# Patient Record
Sex: MALE | Race: WHITE | NOT HISPANIC OR LATINO | URBAN - METROPOLITAN AREA
[De-identification: names, ages, dates, MRNs, and addresses within clinical notes are randomized per-mention and may not be internally consistent; named-entity substitution may affect disease eponyms.]

---

## 2019-09-05 ENCOUNTER — EMERGENCY (EMERGENCY)
Facility: HOSPITAL | Age: 49
LOS: 1 days | Discharge: HOME | End: 2019-09-05
Attending: EMERGENCY MEDICINE
Payer: COMMERCIAL

## 2019-09-05 VITALS
DIASTOLIC BLOOD PRESSURE: 92 MMHG | SYSTOLIC BLOOD PRESSURE: 147 MMHG | OXYGEN SATURATION: 98 % | RESPIRATION RATE: 16 BRPM | HEART RATE: 64 BPM | TEMPERATURE: 98 F

## 2019-09-05 VITALS
SYSTOLIC BLOOD PRESSURE: 155 MMHG | RESPIRATION RATE: 18 BRPM | TEMPERATURE: 98 F | HEART RATE: 72 BPM | OXYGEN SATURATION: 99 % | WEIGHT: 220.02 LBS | DIASTOLIC BLOOD PRESSURE: 97 MMHG

## 2019-09-05 LAB
ALBUMIN SERPL ELPH-MCNC: 4.5 G/DL — SIGNIFICANT CHANGE UP (ref 3.3–5)
ALP SERPL-CCNC: 61 U/L — SIGNIFICANT CHANGE UP (ref 40–120)
ALT FLD-CCNC: 56 U/L — HIGH (ref 10–45)
ANION GAP SERPL CALC-SCNC: 13 MMOL/L — SIGNIFICANT CHANGE UP (ref 5–17)
AST SERPL-CCNC: 34 U/L — SIGNIFICANT CHANGE UP (ref 10–40)
BILIRUB SERPL-MCNC: 0.6 MG/DL — SIGNIFICANT CHANGE UP (ref 0.2–1.2)
BUN SERPL-MCNC: 20 MG/DL — SIGNIFICANT CHANGE UP (ref 7–23)
CALCIUM SERPL-MCNC: 10.2 MG/DL — SIGNIFICANT CHANGE UP (ref 8.4–10.5)
CHLORIDE SERPL-SCNC: 98 MMOL/L — SIGNIFICANT CHANGE UP (ref 96–108)
CO2 SERPL-SCNC: 24 MMOL/L — SIGNIFICANT CHANGE UP (ref 22–31)
CREAT SERPL-MCNC: 0.91 MG/DL — SIGNIFICANT CHANGE UP (ref 0.5–1.3)
GLUCOSE SERPL-MCNC: 180 MG/DL — HIGH (ref 70–99)
HCT VFR BLD CALC: 45.1 % — SIGNIFICANT CHANGE UP (ref 39–50)
HGB BLD-MCNC: 15.1 G/DL — SIGNIFICANT CHANGE UP (ref 13–17)
MCHC RBC-ENTMCNC: 29.2 PG — SIGNIFICANT CHANGE UP (ref 27–34)
MCHC RBC-ENTMCNC: 33.6 GM/DL — SIGNIFICANT CHANGE UP (ref 32–36)
MCV RBC AUTO: 86.9 FL — SIGNIFICANT CHANGE UP (ref 80–100)
PLATELET # BLD AUTO: 218 K/UL — SIGNIFICANT CHANGE UP (ref 150–400)
POTASSIUM SERPL-MCNC: 4.2 MMOL/L — SIGNIFICANT CHANGE UP (ref 3.5–5.3)
POTASSIUM SERPL-SCNC: 4.2 MMOL/L — SIGNIFICANT CHANGE UP (ref 3.5–5.3)
PROT SERPL-MCNC: 7.8 G/DL — SIGNIFICANT CHANGE UP (ref 6–8.3)
RBC # BLD: 5.19 M/UL — SIGNIFICANT CHANGE UP (ref 4.2–5.8)
RBC # FLD: 13.5 % — SIGNIFICANT CHANGE UP (ref 10.3–14.5)
SODIUM SERPL-SCNC: 135 MMOL/L — SIGNIFICANT CHANGE UP (ref 135–145)
WBC # BLD: 10.8 K/UL — HIGH (ref 3.8–10.5)
WBC # FLD AUTO: 10.8 K/UL — HIGH (ref 3.8–10.5)

## 2019-09-05 PROCEDURE — 99284 EMERGENCY DEPT VISIT MOD MDM: CPT

## 2019-09-05 PROCEDURE — 96374 THER/PROPH/DIAG INJ IV PUSH: CPT

## 2019-09-05 PROCEDURE — 93971 EXTREMITY STUDY: CPT | Mod: 26

## 2019-09-05 PROCEDURE — 93971 EXTREMITY STUDY: CPT

## 2019-09-05 PROCEDURE — 80053 COMPREHEN METABOLIC PANEL: CPT

## 2019-09-05 PROCEDURE — 99284 EMERGENCY DEPT VISIT MOD MDM: CPT | Mod: 25

## 2019-09-05 PROCEDURE — 85027 COMPLETE CBC AUTOMATED: CPT

## 2019-09-05 RX ORDER — CEFTRIAXONE 500 MG/1
1000 INJECTION, POWDER, FOR SOLUTION INTRAMUSCULAR; INTRAVENOUS ONCE
Refills: 0 | Status: COMPLETED | OUTPATIENT
Start: 2019-09-05 | End: 2019-09-05

## 2019-09-05 RX ORDER — CEPHALEXIN 500 MG
1 CAPSULE ORAL
Qty: 28 | Refills: 0
Start: 2019-09-05 | End: 2019-09-11

## 2019-09-05 RX ORDER — CEFTRIAXONE 500 MG/1
1000 INJECTION, POWDER, FOR SOLUTION INTRAMUSCULAR; INTRAVENOUS ONCE
Refills: 0 | Status: DISCONTINUED | OUTPATIENT
Start: 2019-09-05 | End: 2019-09-05

## 2019-09-05 RX ORDER — IBUPROFEN 200 MG
600 TABLET ORAL ONCE
Refills: 0 | Status: COMPLETED | OUTPATIENT
Start: 2019-09-05 | End: 2019-09-05

## 2019-09-05 RX ADMIN — Medication 600 MILLIGRAM(S): at 17:48

## 2019-09-05 RX ADMIN — CEFTRIAXONE 100 MILLIGRAM(S): 500 INJECTION, POWDER, FOR SOLUTION INTRAMUSCULAR; INTRAVENOUS at 18:29

## 2019-09-05 RX ADMIN — Medication 600 MILLIGRAM(S): at 15:02

## 2019-09-05 NOTE — ED PROVIDER NOTE - PROGRESS NOTE DETAILS
Resident Evy Hilliard: patient is re-evaluated. no new complaints. Labs/imaging results are discussed. All questions are answered. Discharge plan is dicussed with the patient: patient reports understanding to follow up with his/her PCP within *3-5days, and to return to ER if patient develops worsening rash, fever, pain/swelling in calf or any other alarming symptoms.

## 2019-09-05 NOTE — ED PROVIDER NOTE - NS ED ROS FT
Gen: Denies fever, chills  CV: Denies chest pain,  Skin: +rash on right leg  Resp: Denies SOB  GI: Denies nausea, vomiting, diarrhea  Msk:+rle swelling/pain Denies back pain,  : Denies dysuria, increased frequency/urgency  Neuro: Denies LOC, weakness

## 2019-09-05 NOTE — ED PROVIDER NOTE - OBJECTIVE STATEMENT
49 yo M with pmhx of HTN, DM, eczema presents with redness/swelling of rt lower extremity for past 2 days. Denies trauma to leg. Last long drive - 4hrs - 2 weeks ago. Denies hx of PE, DVT, chest pain, shortness of breath, abdominal pain, nausea, vomiting, diarrhea. Denies IV drug use, recent hospitalization. 47 yo M with pmhx of HTN, DM, eczema presents with redness/swelling of rt lower extremity for past 2 days. Denies trauma to leg. Last long drive - 4hrs - 2 weeks ago. Denies hx of PE, DVT, chest pain, shortness of breath, abdominal pain, nausea, vomiting, diarrhea. Denies IV drug use, recent hospitalization.  Sent by PCP r/o DVT

## 2019-09-05 NOTE — ED PROVIDER NOTE - NSFOLLOWUPINSTRUCTIONS_ED_ALL_ED_FT
1. TAKE ALL MEDICATIONS AS DIRECTED.    2. FOR PAIN OR FEVER YOU CAN TAKE IBUPROFEN (MOTRIN, ADVIL) OR ACETAMINOPHEN (TYLENOL) AS NEEDED, AS DIRECTED ON PACKAGING.  3. FOLLOW UP WITH YOUR PRIMARY DOCTOR WITHIN 3-5 DAYS AS DIRECTED.  4. IF YOU HAD LABS OR IMAGING DONE, YOU WERE GIVEN COPIES OF ALL LABS AND/OR IMAGING RESULTS FROM YOUR ER VISIT--PLEASE TAKE THEM WITH YOU TO YOUR FOLLOW UP APPOINTMENTS.  5. IF NEEDED, CALL PATIENT ACCESS SERVICES AT [phone] TO FIND A PRIMARY CARE PHYSICIAN.  OR CALL [phone] TO MAKE AN APPOINTMENT WITH THE CLINIC.  6. RETURN TO THE ER FOR ANY WORSENING SYMPTOMS OR CONCERNS.

## 2019-09-05 NOTE — ED ADULT NURSE NOTE - NSIMPLEMENTINTERV_GEN_ALL_ED
Implemented All Universal Safety Interventions:  [place] to call system. Call bell, personal items and telephone within reach. Instruct patient to call for assistance. Room bathroom lighting operational. Non-slip footwear when patient is off stretcher. Physically safe environment: no spills, clutter or unnecessary equipment. Stretcher in lowest position, wheels locked, appropriate side rails in place.

## 2019-09-05 NOTE — ED PROVIDER NOTE - ATTENDING CONTRIBUTION TO CARE
I performed a history and physical exam of the patient and discussed their management with the resident and /or advanced care provider. I reviewed the resident and /or ACP's note and agree with the documented findings and plan of care. My medical decison making and observations are found above.  Lungs clear lt leg with area of warmth and swelling and pain behind lt knee and up into thigh

## 2019-09-05 NOTE — ED PROVIDER NOTE - PHYSICAL EXAMINATION
Gen: well developed and well nourished, NAD  Eyes: PERRL, EOMI, anicteric  ENT: airway patent, mmm, oral cavity and pharynx normal. No inflammation, swelling, exudate, or lesions.   NECK: Neck supple, non-tender without lymphadenopathy, no masses.  CV: RRR, +S1/S2, no M/R/G  Resp: CTAB, symmetric breath sounds, no W/R/R  GI: +BS, abdomen soft non-distended, NTTP, no masses, no rebound, no guarding  Back: no CVA tenderness  Extremities - FROM, symmetric pulses, capillary refill < 2 seconds, no edema, 5/5 strength, sensation intact  Skin: no rashes, colors changes or bruising  Neuro: A&Ox3, following commands, speech clear, moving all four extremities spontaneously  Psych: appropriate mood, normal insight Gen: well developed and well nourished, NAD  Eyes: PERRL  ENT: airway patent  CV: RRR, +S1/S2, no M/R/G  Resp: CTAB, symmetric breath sounds, no W/R/R  GI: +BS, abdomen soft non-distended, NTTP  Extremities - +4*4cm erythematous patch on medial aspect of rt distal thigh, +ttp; no calf tenderness/swelling; FROM, symmetric pulses, capillary refill < 2 seconds, no edema, 5/5 strength, sensation intact; +RT sole: peeling/dry/mild erythemaous, nontender - c/w eczema  Neuro: A&Ox3, following commands, speech clear, moving all four extremities spontaneously  Psych: appropriate mood, normal insight Gen: well developed and well nourished, NAD  Eyes: PERRL  ENT: airway patent  CV: RRR, +S1/S2, no M/R/G  Resp: CTAB, symmetric breath sounds, no W/R/R  GI: +BS, abdomen soft non-distended, NTTP  Extremities - +4*4cm warm, erythematous patch on medial aspect of rt distal thigh, +ttp; no calf tenderness/swelling; FROM, symmetric pulses, capillary refill < 2 seconds, no edema, 5/5 strength, sensation intact; +RT sole: peeling/dry/mild erythemaous, nontender - c/w eczema  Neuro: A&Ox3, following commands, speech clear, moving all four extremities spontaneously  Psych: appropriate mood, normal insight

## 2019-09-05 NOTE — ED PROVIDER NOTE - CLINICAL SUMMARY MEDICAL DECISION MAKING FREE TEXT BOX
Nagi: lt lower ext swelling redness and warmth. Sent by PMD for R/O dvt but he has no risk factors and no symptoms of dvt.  will get labs for diabetes and start antibiotics.

## 2019-09-05 NOTE — ED ADULT NURSE NOTE - OBJECTIVE STATEMENT
47 y/o M presents ambulatory to ED for a few days of R posterior knee redness/swelling with mild "soreness." Pt reports he has eczema on his feet with open cuts at this time. No fevers, chills, n/v. Pt last took Tylenol/Motrin last night. No SOB, palpitations, chest pain, hemoptysis, cough. Wife at bedside, safety maintained.

## 2019-09-05 NOTE — ED PROVIDER NOTE - PATIENT PORTAL LINK FT
You can access the FollowMyHealth Patient Portal offered by [facility] by registering at the following website: [email or web address]. By joining [employer]’s FollowMyHealth portal, you will also be able to view your health information using other applications (apps) compatible with our system.

## 2020-03-06 PROBLEM — Z00.00 ENCOUNTER FOR PREVENTIVE HEALTH EXAMINATION: Status: ACTIVE | Noted: 2020-03-06

## 2020-03-25 ENCOUNTER — APPOINTMENT (OUTPATIENT)
Dept: SPINE | Facility: CLINIC | Age: 50
End: 2020-03-25

## 2020-08-19 ENCOUNTER — APPOINTMENT (OUTPATIENT)
Dept: SPINE | Facility: CLINIC | Age: 50
End: 2020-08-19
Payer: COMMERCIAL

## 2020-08-19 DIAGNOSIS — Z80.42 FAMILY HISTORY OF MALIGNANT NEOPLASM OF PROSTATE: ICD-10-CM

## 2020-08-19 DIAGNOSIS — Z86.79 PERSONAL HISTORY OF OTHER DISEASES OF THE CIRCULATORY SYSTEM: ICD-10-CM

## 2020-08-19 DIAGNOSIS — Z86.39 PERSONAL HISTORY OF OTHER ENDOCRINE, NUTRITIONAL AND METABOLIC DISEASE: ICD-10-CM

## 2020-08-19 PROCEDURE — 99213 OFFICE O/P EST LOW 20 MIN: CPT | Mod: 95

## 2020-08-19 RX ORDER — EMPAGLIFLOZIN 25 MG/1
25 TABLET, FILM COATED ORAL
Refills: 0 | Status: ACTIVE | COMMUNITY

## 2020-08-19 RX ORDER — ATORVASTATIN CALCIUM 10 MG/1
10 TABLET, FILM COATED ORAL
Refills: 0 | Status: ACTIVE | COMMUNITY

## 2020-08-19 RX ORDER — OLMESARTAN MEDOXOMIL 20 MG/1
20 TABLET, FILM COATED ORAL
Refills: 0 | Status: ACTIVE | COMMUNITY

## 2020-08-19 RX ORDER — METFORMIN HYDROCHLORIDE 500 MG/1
500 TABLET, COATED ORAL
Refills: 0 | Status: ACTIVE | COMMUNITY

## 2020-08-19 RX ORDER — METOPROLOL SUCCINATE 50 MG/1
50 TABLET, EXTENDED RELEASE ORAL DAILY
Refills: 0 | Status: ACTIVE | COMMUNITY

## 2020-08-19 NOTE — PHYSICAL EXAM
[FreeTextEntry1] : The exam is limited due to this being a telehealth appointment.  The patient is alert and oriented to person, place and time.  Higher cortical functions are intact.  Face is symmetrical.  Speech is fluent.\par

## 2020-08-19 NOTE — REASON FOR VISIT
[FreeTextEntry1] : The patient is here for review of an MRI of the brain and sella with and without contrast.

## 2020-08-19 NOTE — HISTORY OF PRESENT ILLNESS
[Home] : at home, [unfilled] , at the time of the visit. [Medical Office: ([facility])___] : at the medical office located in  [Verbal consent obtained from patient] : the patient, [unfilled] [FreeTextEntry4] : MIREYA Salcedo. [FreeTextEntry1] : JOVANNA LEMA is a 49 year old gentleman who upon work up for erectile dysfunction was found to have a pituitary adenoma.  He stated that he is feeling well and denies any changes in his vision.  He has not seen Dr. Doe for visual field testing.\par

## 2020-09-09 ENCOUNTER — APPOINTMENT (OUTPATIENT)
Dept: SPINE | Facility: CLINIC | Age: 50
End: 2020-09-09
Payer: COMMERCIAL

## 2020-09-09 VITALS
HEART RATE: 83 BPM | OXYGEN SATURATION: 97 % | HEIGHT: 70 IN | TEMPERATURE: 96.4 F | WEIGHT: 215 LBS | BODY MASS INDEX: 30.78 KG/M2 | DIASTOLIC BLOOD PRESSURE: 100 MMHG | SYSTOLIC BLOOD PRESSURE: 143 MMHG

## 2020-09-09 PROCEDURE — 99213 OFFICE O/P EST LOW 20 MIN: CPT

## 2020-09-09 NOTE — REASON FOR VISIT
[Follow-Up: _____] : a [unfilled] follow-up visit [FreeTextEntry1] : The patient is here for review of a new MRI of the brain and sella with and without contrast. [Spouse] : spouse

## 2020-09-09 NOTE — DATA REVIEWED
[de-identified] : Brain and sella with and without contrast done at [facility] on 8/18/2020 and 2/29/2020.

## 2020-09-09 NOTE — PHYSICAL EXAM
[Time] : oriented to time [Person] : oriented to person [Place] : oriented to place [Remote Intact] : remote memory intact [Short Term Intact] : short term memory intact [Fluency] : fluency intact [Concentration Intact] : normal concentrating ability [Span Intact] : the attention span was normal [Current Events] : adequate knowledge of current events [Comprehension] : comprehension intact [Past History] : adequate knowledge of personal past history [Vocabulary] : adequate range of vocabulary [Cranial Nerves Oculomotor (III)] : extraocular motion intact [Cranial Nerves Optic (II)] : visual acuity intact bilaterally,  pupils equal round and reactive to light [Cranial Nerves Facial (VII)] : face symmetrical [Cranial Nerves Trigeminal (V)] : facial sensation intact symmetrically [Cranial Nerves Vestibulocochlear (VIII)] : hearing was intact bilaterally [Cranial Nerves Glossopharyngeal (IX)] : tongue and palate midline [Cranial Nerves Accessory (XI - Cranial And Spinal)] : head turning and shoulder shrug symmetric [No Muscle Atrophy] : normal bulk in all four extremities [Cranial Nerves Hypoglossal (XII)] : there was no tongue deviation with protrusion [Motor Tone] : muscle tone was normal in all four extremities [Motor Strength] : muscle strength was normal in all four extremities [Sensation Tactile Decrease] : light touch was intact [Abnormal Walk] : normal gait [Balance] : balance was intact [Past-pointing] : there was no past-pointing [Tremor] : no tremor present [1+] : Patella left 1+

## 2020-09-09 NOTE — ASSESSMENT
[FreeTextEntry1] : The images and findings were reviewed and discussed with the patient and his wife. It was discussed that his eyesight is stable and taking the tumor out would not change his testosterone levels.  The tumor is putting pressure on the optic chiasm.  See dictation by Dr. Gabe M.D.  Surgery to remove the tumor and the risks and benefits were discussed.

## 2020-09-09 NOTE — RESULTS/DATA
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  No significant change.

## 2020-10-07 ENCOUNTER — APPOINTMENT (OUTPATIENT)
Dept: OTOLARYNGOLOGY | Facility: CLINIC | Age: 50
End: 2020-10-07
Payer: COMMERCIAL

## 2020-10-07 VITALS
TEMPERATURE: 98.1 F | HEIGHT: 70 IN | SYSTOLIC BLOOD PRESSURE: 142 MMHG | WEIGHT: 216 LBS | DIASTOLIC BLOOD PRESSURE: 95 MMHG | BODY MASS INDEX: 30.92 KG/M2 | HEART RATE: 84 BPM

## 2020-10-07 PROCEDURE — 31231 NASAL ENDOSCOPY DX: CPT

## 2020-10-07 PROCEDURE — 99204 OFFICE O/P NEW MOD 45 MIN: CPT | Mod: 25

## 2020-10-07 NOTE — REASON FOR VISIT
[Initial Consultation] : an initial consultation for [FreeTextEntry2] : Going for TSPR with Dr. Bernal on 10/29/20

## 2020-10-07 NOTE — HISTORY OF PRESENT ILLNESS
[de-identified] : Going for TSPR with Dr. Bernal on 10/29/20\par No nasal congestion or sinus pain or pressure.  No clear d/c from nose.  Good sense of smell and taste.  Vision is good.  No hx of nasal surgery or trauma.

## 2020-10-07 NOTE — CONSULT LETTER
[Dear  ___] : Dear  [unfilled], [Consult Letter:] : I had the pleasure of evaluating your patient, [unfilled]. [Please see my note below.] : Please see my note below. [Consult Closing:] : Thank you very much for allowing me to participate in the care of this patient.  If you have any questions, please do not hesitate to contact me. [Sincerely,] : Sincerely, [FreeTextEntry3] : Cristina Ashby MD\par Otolaryngology and Cranial Base Surgery\par Attending Physician - Department of Otolaryngology and Head & Neck Surgery\par [facility]\par  - [employer] [facility]\par Office: [phone]\par Fax: [phone]\par

## 2020-10-07 NOTE — PROCEDURE
[FreeTextEntry6] : Afrin and lidocaine were topically sprayed. Flexible scope #2 was used. Right nasal passage with normal inferior, middle and superior turbinates. Nasal passage patent with clear middle meatus and sphenoethmoid recess. Left nasal passage with normal inferior, middle and superior turbinates. Nasal passage was patent but narrowed by left DNS with clear middle meatus and sphenoethmoid recess. No mucopurulence or polyps appreciated. Nasopharynx clear.

## 2020-10-07 NOTE — ASSESSMENT
[FreeTextEntry1] : Going for TSPR with Dr. Bernal on 10/29/20:\par - discussed my role in the surgery including the nasal and sinus procedures that I will be performing in order to provide the neurosurgeon access to the pituitary tumor\par - expected post-op hospital course discussed including need for possible repair of spinal fluid leak and close observation for 2-3 days\par - post-op care consisting of nasal saline irrigations was reviewed, and [facility] sinus pamphlet was given to patient to obtain prior to surgery so they have ready to use on discharge home\par - discussed post-operative issues including nasal congestion, loss of sense of smell which should be temporary but can be permanent, bloody nasal drainage, facial pressure/headaches, bleeding, and infection\par - all questions answered and patient will call if any further concerns\par - plan to see patient on AM of surgery\par

## 2020-10-22 ENCOUNTER — OUTPATIENT (OUTPATIENT)
Dept: OUTPATIENT SERVICES | Facility: HOSPITAL | Age: 50
LOS: 1 days | End: 2020-10-22
Payer: COMMERCIAL

## 2020-10-22 VITALS
HEIGHT: 70 IN | TEMPERATURE: 98 F | RESPIRATION RATE: 16 BRPM | DIASTOLIC BLOOD PRESSURE: 89 MMHG | HEART RATE: 80 BPM | WEIGHT: 216.05 LBS | SYSTOLIC BLOOD PRESSURE: 145 MMHG | OXYGEN SATURATION: 98 %

## 2020-10-22 DIAGNOSIS — Z01.818 ENCOUNTER FOR OTHER PREPROCEDURAL EXAMINATION: ICD-10-CM

## 2020-10-22 DIAGNOSIS — D35.2 BENIGN NEOPLASM OF PITUITARY GLAND: ICD-10-CM

## 2020-10-22 DIAGNOSIS — Z29.9 ENCOUNTER FOR PROPHYLACTIC MEASURES, UNSPECIFIED: ICD-10-CM

## 2020-10-22 DIAGNOSIS — E11.9 TYPE 2 DIABETES MELLITUS WITHOUT COMPLICATIONS: ICD-10-CM

## 2020-10-22 DIAGNOSIS — I10 ESSENTIAL (PRIMARY) HYPERTENSION: ICD-10-CM

## 2020-10-22 LAB
A1C WITH ESTIMATED AVERAGE GLUCOSE RESULT: 7 % — HIGH (ref 4–5.6)
ANION GAP SERPL CALC-SCNC: 10 MMOL/L — SIGNIFICANT CHANGE UP (ref 5–17)
BLD GP AB SCN SERPL QL: NEGATIVE — SIGNIFICANT CHANGE UP
BUN SERPL-MCNC: 18 MG/DL — SIGNIFICANT CHANGE UP (ref 7–23)
CALCIUM SERPL-MCNC: 10 MG/DL — SIGNIFICANT CHANGE UP (ref 8.4–10.5)
CHLORIDE SERPL-SCNC: 101 MMOL/L — SIGNIFICANT CHANGE UP (ref 96–108)
CO2 SERPL-SCNC: 26 MMOL/L — SIGNIFICANT CHANGE UP (ref 22–31)
CREAT SERPL-MCNC: 0.91 MG/DL — SIGNIFICANT CHANGE UP (ref 0.5–1.3)
ESTIMATED AVERAGE GLUCOSE: 154 MG/DL — HIGH (ref 68–114)
GLUCOSE SERPL-MCNC: 166 MG/DL — HIGH (ref 70–99)
HCT VFR BLD CALC: 50.4 % — HIGH (ref 39–50)
HGB BLD-MCNC: 16.8 G/DL — SIGNIFICANT CHANGE UP (ref 13–17)
HIV 1+2 AB+HIV1 P24 AG SERPL QL IA: SIGNIFICANT CHANGE UP
MCHC RBC-ENTMCNC: 29.9 PG — SIGNIFICANT CHANGE UP (ref 27–34)
MCHC RBC-ENTMCNC: 33.3 GM/DL — SIGNIFICANT CHANGE UP (ref 32–36)
MCV RBC AUTO: 89.8 FL — SIGNIFICANT CHANGE UP (ref 80–100)
NRBC # BLD: 0 /100 WBCS — SIGNIFICANT CHANGE UP (ref 0–0)
PLATELET # BLD AUTO: 225 K/UL — SIGNIFICANT CHANGE UP (ref 150–400)
POTASSIUM SERPL-MCNC: 4.4 MMOL/L — SIGNIFICANT CHANGE UP (ref 3.5–5.3)
POTASSIUM SERPL-SCNC: 4.4 MMOL/L — SIGNIFICANT CHANGE UP (ref 3.5–5.3)
RBC # BLD: 5.61 M/UL — SIGNIFICANT CHANGE UP (ref 4.2–5.8)
RBC # FLD: 14.5 % — SIGNIFICANT CHANGE UP (ref 10.3–14.5)
RH IG SCN BLD-IMP: POSITIVE — SIGNIFICANT CHANGE UP
SODIUM SERPL-SCNC: 137 MMOL/L — SIGNIFICANT CHANGE UP (ref 135–145)
WBC # BLD: 10.22 K/UL — SIGNIFICANT CHANGE UP (ref 3.8–10.5)
WBC # FLD AUTO: 10.22 K/UL — SIGNIFICANT CHANGE UP (ref 3.8–10.5)

## 2020-10-22 PROCEDURE — 86900 BLOOD TYPING SEROLOGIC ABO: CPT

## 2020-10-22 PROCEDURE — 83036 HEMOGLOBIN GLYCOSYLATED A1C: CPT

## 2020-10-22 PROCEDURE — 80048 BASIC METABOLIC PNL TOTAL CA: CPT

## 2020-10-22 PROCEDURE — 86901 BLOOD TYPING SEROLOGIC RH(D): CPT

## 2020-10-22 PROCEDURE — 87389 HIV-1 AG W/HIV-1&-2 AB AG IA: CPT

## 2020-10-22 PROCEDURE — G0463: CPT

## 2020-10-22 PROCEDURE — 86850 RBC ANTIBODY SCREEN: CPT

## 2020-10-22 PROCEDURE — 85027 COMPLETE CBC AUTOMATED: CPT

## 2020-10-22 RX ORDER — CEFAZOLIN SODIUM 1 G
2000 VIAL (EA) INJECTION ONCE
Refills: 0 | Status: DISCONTINUED | OUTPATIENT
Start: 2020-10-29 | End: 2020-10-30

## 2020-10-22 NOTE — H&P PST ADULT - NSICDXPASTMEDICALHX_GEN_ALL_CORE_FT
PAST MEDICAL HISTORY:  History of high cholesterol     HTN (hypertension)     Pituitary macroadenoma     T2DM (type 2 diabetes mellitus)

## 2020-10-22 NOTE — H&P PST ADULT - NEUROLOGICAL DETAILS
alert and oriented x 3/responds to verbal commands/normal strength/sensation intact/no spontaneous movement

## 2020-10-22 NOTE — H&P PST ADULT - ASSESSMENT
MANJEETI VTE 2.0 SCORE [CLOT updated 2019]    AGE RELATED RISK FACTORS                                                       MOBILITY RELATED FACTORS  [ x] Age 41-60 years                                            (1 Point)                    [ ] Bed rest                                                        (1 Point)  [ ] Age: 61-74 years                                           (2 Points)                  [ ] Plaster cast                                                   (2 Points)  [ ] Age= 75 years                                              (3 Points)                    [ ] Bed bound for more than 72 hours                 (2 Points)    DISEASE RELATED RISK FACTORS                                               GENDER SPECIFIC FACTORS  [ ] Edema in the lower extremities                       (1 Point)              [ ] Pregnancy                                                     (1 Point)  [ ] Varicose veins                                               (1 Point)                     [ ] Post-partum < 6 weeks                                   (1 Point)             [x ] BMI > 25 Kg/m2                                            (1 Point)                     [ ] Hormonal therapy  or oral contraception          (1 Point)                 [ ] Sepsis (in the previous month)                        (1 Point)               [ ] History of pregnancy complications                 (1 point)  [ ] Pneumonia or serious lung disease                                               [ ] Unexplained or recurrent                     (1 Point)           (in the previous month)                               (1 Point)  [ ] Abnormal pulmonary function test                     (1 Point)                 SURGERY RELATED RISK FACTORS  [ ] Acute myocardial infarction                              (1 Point)               [ ]  Section                                             (1 Point)  [ ] Congestive heart failure (in the previous month)  (1 Point)      [ ] Minor surgery                                                  (1 Point)   [ ] Inflammatory bowel disease                             (1 Point)               [ ] Arthroscopic surgery                                        (2 Points)  [ ] Central venous access                                      (2 Points)                [ x] General surgery lasting more than 45 minutes (2 points)  [ ] Malignancy- Present or previous                   (2 Points)                [ ] Elective arthroplasty                                         (5 points)    [ ] Stroke (in the previous month)                          (5 Points)                                                                                                                                                           HEMATOLOGY RELATED FACTORS                                                 TRAUMA RELATED RISK FACTORS  [ ] Prior episodes of VTE                                     (3 Points)                [ ] Fracture of the hip, pelvis, or leg                       (5 Points)  [ ] Positive family history for VTE                         (3 Points)             [ ] Acute spinal cord injury (in the previous month)  (5 Points)  [ ] Prothrombin 12510 A                                     (3 Points)               [ ] Paralysis  (less than 1 month)                             (5 Points)  [ ] Factor V Leiden                                             (3 Points)                  [ ] Multiple Trauma within 1 month                        (5 Points)  [ ] Lupus anticoagulants                                     (3 Points)                                                           [ ] Anticardiolipin antibodies                               (3 Points)                                                       [ ] High homocysteine in the blood                      (3 Points)                                             [ ] Other congenital or acquired thrombophilia      (3 Points)                                                [ ] Heparin induced thrombocytopenia                  (3 Points)                                     Total Score [    4      ]

## 2020-10-22 NOTE — H&P PST ADULT - NSICDXPROBLEM_GEN_ALL_CORE_FT
PROBLEM DIAGNOSES  Problem: Pituitary macroadenoma  Assessment and Plan:  Endoscopic Transphenoidal Removal Pituitary Tumor on 10/29/20.   Pre-op education provided - all questions answered. Pt verbalized understanding     Problem: T2DM (type 2 diabetes mellitus)  Assessment and Plan: Finger stick on day of surgery   Hold Metformin 24 hrs prior to surgery   Hold Jardiance 3 day prior to surgery     Problem: HTN (hypertension)  Assessment and Plan: continue on antihypertensive medications     Problem: Need for prophylactic measure  Assessment and Plan: The Caprini score indicates this patient is at risk for a VTE event (score 3-5).  Most surgical patients in this group would benefit from pharmacologic prophylaxis.  The surgical team will determine the balance between VTE risk and bleeding risk

## 2020-10-23 ENCOUNTER — APPOINTMENT (OUTPATIENT)
Dept: CT IMAGING | Facility: IMAGING CENTER | Age: 50
End: 2020-10-23
Payer: COMMERCIAL

## 2020-10-23 ENCOUNTER — OUTPATIENT (OUTPATIENT)
Dept: OUTPATIENT SERVICES | Facility: HOSPITAL | Age: 50
LOS: 1 days | End: 2020-10-23
Payer: COMMERCIAL

## 2020-10-23 ENCOUNTER — APPOINTMENT (OUTPATIENT)
Dept: MRI IMAGING | Facility: IMAGING CENTER | Age: 50
End: 2020-10-23
Payer: COMMERCIAL

## 2020-10-23 DIAGNOSIS — D35.2 BENIGN NEOPLASM OF PITUITARY GLAND: ICD-10-CM

## 2020-10-23 PROBLEM — Z86.39 PERSONAL HISTORY OF OTHER ENDOCRINE, NUTRITIONAL AND METABOLIC DISEASE: Chronic | Status: ACTIVE | Noted: 2020-10-22

## 2020-10-23 PROBLEM — E11.9 TYPE 2 DIABETES MELLITUS WITHOUT COMPLICATIONS: Chronic | Status: ACTIVE | Noted: 2020-10-22

## 2020-10-23 PROBLEM — I10 ESSENTIAL (PRIMARY) HYPERTENSION: Chronic | Status: ACTIVE | Noted: 2020-10-22

## 2020-10-23 PROCEDURE — 70553 MRI BRAIN STEM W/O & W/DYE: CPT | Mod: 26

## 2020-10-23 PROCEDURE — 70553 MRI BRAIN STEM W/O & W/DYE: CPT

## 2020-10-23 PROCEDURE — 70450 CT HEAD/BRAIN W/O DYE: CPT

## 2020-10-23 PROCEDURE — 70450 CT HEAD/BRAIN W/O DYE: CPT | Mod: 26

## 2020-10-23 PROCEDURE — A9585: CPT

## 2020-10-25 DIAGNOSIS — Z01.818 ENCOUNTER FOR OTHER PREPROCEDURAL EXAMINATION: ICD-10-CM

## 2020-10-26 ENCOUNTER — APPOINTMENT (OUTPATIENT)
Dept: DISASTER EMERGENCY | Facility: CLINIC | Age: 50
End: 2020-10-26

## 2020-10-27 LAB — SARS-COV-2 N GENE NPH QL NAA+PROBE: NOT DETECTED

## 2020-10-28 ENCOUNTER — TRANSCRIPTION ENCOUNTER (OUTPATIENT)
Age: 50
End: 2020-10-28

## 2020-10-29 ENCOUNTER — RESULT REVIEW (OUTPATIENT)
Age: 50
End: 2020-10-29

## 2020-10-29 ENCOUNTER — INPATIENT (INPATIENT)
Facility: HOSPITAL | Age: 50
LOS: 6 days | Discharge: HOME | DRG: 614 | End: 2020-11-05
Attending: NEUROLOGICAL SURGERY | Admitting: NEUROLOGICAL SURGERY
Payer: COMMERCIAL

## 2020-10-29 ENCOUNTER — APPOINTMENT (OUTPATIENT)
Dept: SPINE | Facility: HOSPITAL | Age: 50
End: 2020-10-29

## 2020-10-29 ENCOUNTER — APPOINTMENT (OUTPATIENT)
Dept: OTOLARYNGOLOGY | Facility: HOSPITAL | Age: 50
End: 2020-10-29

## 2020-10-29 ENCOUNTER — TRANSCRIPTION ENCOUNTER (OUTPATIENT)
Age: 50
End: 2020-10-29

## 2020-10-29 VITALS
SYSTOLIC BLOOD PRESSURE: 131 MMHG | DIASTOLIC BLOOD PRESSURE: 87 MMHG | WEIGHT: 216.05 LBS | RESPIRATION RATE: 16 BRPM | TEMPERATURE: 98 F | HEART RATE: 80 BPM | OXYGEN SATURATION: 98 % | HEIGHT: 70 IN

## 2020-10-29 DIAGNOSIS — D35.2 BENIGN NEOPLASM OF PITUITARY GLAND: ICD-10-CM

## 2020-10-29 LAB
ANION GAP SERPL CALC-SCNC: 13 MMOL/L — SIGNIFICANT CHANGE UP (ref 5–17)
ANION GAP SERPL CALC-SCNC: 16 MMOL/L — SIGNIFICANT CHANGE UP (ref 5–17)
BUN SERPL-MCNC: 15 MG/DL — SIGNIFICANT CHANGE UP (ref 7–23)
BUN SERPL-MCNC: 17 MG/DL — SIGNIFICANT CHANGE UP (ref 7–23)
CALCIUM SERPL-MCNC: 8.4 MG/DL — SIGNIFICANT CHANGE UP (ref 8.4–10.5)
CALCIUM SERPL-MCNC: 8.7 MG/DL — SIGNIFICANT CHANGE UP (ref 8.4–10.5)
CHLORIDE SERPL-SCNC: 101 MMOL/L — SIGNIFICANT CHANGE UP (ref 96–108)
CHLORIDE SERPL-SCNC: 102 MMOL/L — SIGNIFICANT CHANGE UP (ref 96–108)
CO2 SERPL-SCNC: 19 MMOL/L — LOW (ref 22–31)
CO2 SERPL-SCNC: 19 MMOL/L — LOW (ref 22–31)
CREAT SERPL-MCNC: 0.87 MG/DL — SIGNIFICANT CHANGE UP (ref 0.5–1.3)
CREAT SERPL-MCNC: 0.88 MG/DL — SIGNIFICANT CHANGE UP (ref 0.5–1.3)
GLUCOSE BLDC GLUCOMTR-MCNC: 138 MG/DL — HIGH (ref 70–99)
GLUCOSE BLDC GLUCOMTR-MCNC: 141 MG/DL — HIGH (ref 70–99)
GLUCOSE BLDC GLUCOMTR-MCNC: 156 MG/DL — HIGH (ref 70–99)
GLUCOSE SERPL-MCNC: 157 MG/DL — HIGH (ref 70–99)
GLUCOSE SERPL-MCNC: 165 MG/DL — HIGH (ref 70–99)
HCT VFR BLD CALC: 46 % — SIGNIFICANT CHANGE UP (ref 39–50)
HGB BLD-MCNC: 15.4 G/DL — SIGNIFICANT CHANGE UP (ref 13–17)
MAGNESIUM SERPL-MCNC: 2.1 MG/DL — SIGNIFICANT CHANGE UP (ref 1.6–2.6)
MAGNESIUM SERPL-MCNC: 2.3 MG/DL — SIGNIFICANT CHANGE UP (ref 1.6–2.6)
MCHC RBC-ENTMCNC: 29.7 PG — SIGNIFICANT CHANGE UP (ref 27–34)
MCHC RBC-ENTMCNC: 33.5 GM/DL — SIGNIFICANT CHANGE UP (ref 32–36)
MCV RBC AUTO: 88.8 FL — SIGNIFICANT CHANGE UP (ref 80–100)
NRBC # BLD: 0 /100 WBCS — SIGNIFICANT CHANGE UP (ref 0–0)
OSMOLALITY UR: 724 MOS/KG — SIGNIFICANT CHANGE UP (ref 300–900)
PHOSPHATE SERPL-MCNC: 2.4 MG/DL — LOW (ref 2.5–4.5)
PHOSPHATE SERPL-MCNC: 4.1 MG/DL — SIGNIFICANT CHANGE UP (ref 2.5–4.5)
PLATELET # BLD AUTO: 234 K/UL — SIGNIFICANT CHANGE UP (ref 150–400)
POTASSIUM SERPL-MCNC: 4.1 MMOL/L — SIGNIFICANT CHANGE UP (ref 3.5–5.3)
POTASSIUM SERPL-MCNC: 4.1 MMOL/L — SIGNIFICANT CHANGE UP (ref 3.5–5.3)
POTASSIUM SERPL-SCNC: 4.1 MMOL/L — SIGNIFICANT CHANGE UP (ref 3.5–5.3)
POTASSIUM SERPL-SCNC: 4.1 MMOL/L — SIGNIFICANT CHANGE UP (ref 3.5–5.3)
RBC # BLD: 5.18 M/UL — SIGNIFICANT CHANGE UP (ref 4.2–5.8)
RBC # FLD: 14.6 % — HIGH (ref 10.3–14.5)
RH IG SCN BLD-IMP: POSITIVE — SIGNIFICANT CHANGE UP
SODIUM SERPL-SCNC: 133 MMOL/L — LOW (ref 135–145)
SODIUM SERPL-SCNC: 137 MMOL/L — SIGNIFICANT CHANGE UP (ref 135–145)
SODIUM UR-SCNC: 97 MMOL/L — SIGNIFICANT CHANGE UP
WBC # BLD: 14.08 K/UL — HIGH (ref 3.8–10.5)
WBC # FLD AUTO: 14.08 K/UL — HIGH (ref 3.8–10.5)

## 2020-10-29 PROCEDURE — 62165 REMOVE PITUIT TUMOR W/SCOPE: CPT | Mod: 62

## 2020-10-29 PROCEDURE — 88360 TUMOR IMMUNOHISTOCHEM/MANUAL: CPT | Mod: 26

## 2020-10-29 PROCEDURE — 99024 POSTOP FOLLOW-UP VISIT: CPT

## 2020-10-29 PROCEDURE — 61782 SCAN PROC CRANIAL EXTRA: CPT

## 2020-10-29 PROCEDURE — 99291 CRITICAL CARE FIRST HOUR: CPT

## 2020-10-29 PROCEDURE — 88342 IMHCHEM/IMCYTCHM 1ST ANTB: CPT | Mod: 26,59

## 2020-10-29 PROCEDURE — 15576 PEDICLE E/N/E/L/NTRORAL: CPT

## 2020-10-29 PROCEDURE — 88341 IMHCHEM/IMCYTCHM EA ADD ANTB: CPT | Mod: 26,59

## 2020-10-29 PROCEDURE — 61781 SCAN PROC CRANIAL INTRA: CPT

## 2020-10-29 PROCEDURE — 88307 TISSUE EXAM BY PATHOLOGIST: CPT | Mod: 26

## 2020-10-29 PROCEDURE — 93970 EXTREMITY STUDY: CPT | Mod: 26

## 2020-10-29 RX ORDER — INSULIN LISPRO 100/ML
VIAL (ML) SUBCUTANEOUS
Refills: 0 | Status: DISCONTINUED | OUTPATIENT
Start: 2020-10-29 | End: 2020-11-05

## 2020-10-29 RX ORDER — SODIUM CHLORIDE 9 MG/ML
3 INJECTION INTRAMUSCULAR; INTRAVENOUS; SUBCUTANEOUS EVERY 8 HOURS
Refills: 0 | Status: DISCONTINUED | OUTPATIENT
Start: 2020-10-29 | End: 2020-10-29

## 2020-10-29 RX ORDER — SODIUM CHLORIDE 9 MG/ML
1000 INJECTION, SOLUTION INTRAVENOUS
Refills: 0 | Status: DISCONTINUED | OUTPATIENT
Start: 2020-10-29 | End: 2020-11-05

## 2020-10-29 RX ORDER — METOPROLOL TARTRATE 50 MG
1 TABLET ORAL
Qty: 0 | Refills: 0 | DISCHARGE

## 2020-10-29 RX ORDER — EMPAGLIFLOZIN 10 MG/1
1 TABLET, FILM COATED ORAL
Qty: 0 | Refills: 0 | DISCHARGE

## 2020-10-29 RX ORDER — METFORMIN HYDROCHLORIDE 850 MG/1
3 TABLET ORAL
Qty: 0 | Refills: 0 | DISCHARGE

## 2020-10-29 RX ORDER — SODIUM CHLORIDE 9 MG/ML
1000 INJECTION INTRAMUSCULAR; INTRAVENOUS; SUBCUTANEOUS
Refills: 0 | Status: DISCONTINUED | OUTPATIENT
Start: 2020-10-29 | End: 2020-10-29

## 2020-10-29 RX ORDER — INSULIN LISPRO 100/ML
VIAL (ML) SUBCUTANEOUS AT BEDTIME
Refills: 0 | Status: DISCONTINUED | OUTPATIENT
Start: 2020-10-29 | End: 2020-11-05

## 2020-10-29 RX ORDER — ONDANSETRON 8 MG/1
4 TABLET, FILM COATED ORAL EVERY 6 HOURS
Refills: 0 | Status: DISCONTINUED | OUTPATIENT
Start: 2020-10-29 | End: 2020-11-05

## 2020-10-29 RX ORDER — ATORVASTATIN CALCIUM 80 MG/1
1 TABLET, FILM COATED ORAL
Qty: 0 | Refills: 0 | DISCHARGE

## 2020-10-29 RX ORDER — OXYCODONE HYDROCHLORIDE 5 MG/1
10 TABLET ORAL ONCE
Refills: 0 | Status: DISCONTINUED | OUTPATIENT
Start: 2020-10-29 | End: 2020-10-29

## 2020-10-29 RX ORDER — TRAMADOL HYDROCHLORIDE 50 MG/1
50 TABLET ORAL EVERY 4 HOURS
Refills: 0 | Status: DISCONTINUED | OUTPATIENT
Start: 2020-10-29 | End: 2020-10-31

## 2020-10-29 RX ORDER — CEFAZOLIN SODIUM 1 G
2000 VIAL (EA) INJECTION EVERY 8 HOURS
Refills: 0 | Status: COMPLETED | OUTPATIENT
Start: 2020-10-29 | End: 2020-10-30

## 2020-10-29 RX ORDER — SENNA PLUS 8.6 MG/1
2 TABLET ORAL AT BEDTIME
Refills: 0 | Status: DISCONTINUED | OUTPATIENT
Start: 2020-10-29 | End: 2020-11-05

## 2020-10-29 RX ORDER — GLUCAGON INJECTION, SOLUTION 0.5 MG/.1ML
1 INJECTION, SOLUTION SUBCUTANEOUS ONCE
Refills: 0 | Status: DISCONTINUED | OUTPATIENT
Start: 2020-10-29 | End: 2020-11-05

## 2020-10-29 RX ORDER — DEXTROSE 50 % IN WATER 50 %
12.5 SYRINGE (ML) INTRAVENOUS ONCE
Refills: 0 | Status: DISCONTINUED | OUTPATIENT
Start: 2020-10-29 | End: 2020-11-05

## 2020-10-29 RX ORDER — DEXTROSE 50 % IN WATER 50 %
25 SYRINGE (ML) INTRAVENOUS ONCE
Refills: 0 | Status: DISCONTINUED | OUTPATIENT
Start: 2020-10-29 | End: 2020-11-05

## 2020-10-29 RX ORDER — LIDOCAINE HCL 20 MG/ML
0.2 VIAL (ML) INJECTION ONCE
Refills: 0 | Status: DISCONTINUED | OUTPATIENT
Start: 2020-10-29 | End: 2020-10-29

## 2020-10-29 RX ORDER — SODIUM CHLORIDE 0.65 %
2 AEROSOL, SPRAY (ML) NASAL
Refills: 0 | Status: DISCONTINUED | OUTPATIENT
Start: 2020-10-29 | End: 2020-11-05

## 2020-10-29 RX ORDER — OXYCODONE AND ACETAMINOPHEN 5; 325 MG/1; MG/1
1 TABLET ORAL ONCE
Refills: 0 | Status: DISCONTINUED | OUTPATIENT
Start: 2020-10-29 | End: 2020-10-29

## 2020-10-29 RX ORDER — OLMESARTAN MEDOXOMIL 5 MG/1
1 TABLET, FILM COATED ORAL
Qty: 0 | Refills: 0 | DISCHARGE

## 2020-10-29 RX ORDER — DEXTROSE 50 % IN WATER 50 %
15 SYRINGE (ML) INTRAVENOUS ONCE
Refills: 0 | Status: DISCONTINUED | OUTPATIENT
Start: 2020-10-29 | End: 2020-11-05

## 2020-10-29 RX ORDER — ACETAMINOPHEN 500 MG
1000 TABLET ORAL ONCE
Refills: 0 | Status: COMPLETED | OUTPATIENT
Start: 2020-10-29 | End: 2020-10-29

## 2020-10-29 RX ORDER — ATORVASTATIN CALCIUM 80 MG/1
10 TABLET, FILM COATED ORAL AT BEDTIME
Refills: 0 | Status: DISCONTINUED | OUTPATIENT
Start: 2020-10-29 | End: 2020-11-05

## 2020-10-29 RX ORDER — CHLORHEXIDINE GLUCONATE 213 G/1000ML
1 SOLUTION TOPICAL
Refills: 0 | Status: DISCONTINUED | OUTPATIENT
Start: 2020-10-29 | End: 2020-10-30

## 2020-10-29 RX ORDER — METOPROLOL TARTRATE 50 MG
50 TABLET ORAL DAILY
Refills: 0 | Status: DISCONTINUED | OUTPATIENT
Start: 2020-10-29 | End: 2020-11-05

## 2020-10-29 RX ORDER — LOSARTAN POTASSIUM 100 MG/1
50 TABLET, FILM COATED ORAL DAILY
Refills: 0 | Status: DISCONTINUED | OUTPATIENT
Start: 2020-10-29 | End: 2020-11-05

## 2020-10-29 RX ORDER — INFLUENZA VIRUS VACCINE 15; 15; 15; 15 UG/.5ML; UG/.5ML; UG/.5ML; UG/.5ML
0.5 SUSPENSION INTRAMUSCULAR ONCE
Refills: 0 | Status: COMPLETED | OUTPATIENT
Start: 2020-10-29 | End: 2020-11-05

## 2020-10-29 RX ADMIN — Medication 100 MILLIGRAM(S): at 23:07

## 2020-10-29 RX ADMIN — TRAMADOL HYDROCHLORIDE 50 MILLIGRAM(S): 50 TABLET ORAL at 20:10

## 2020-10-29 RX ADMIN — TRAMADOL HYDROCHLORIDE 50 MILLIGRAM(S): 50 TABLET ORAL at 14:19

## 2020-10-29 RX ADMIN — Medication 2: at 17:30

## 2020-10-29 RX ADMIN — CHLORHEXIDINE GLUCONATE 1 APPLICATION(S): 213 SOLUTION TOPICAL at 21:12

## 2020-10-29 RX ADMIN — OXYCODONE AND ACETAMINOPHEN 1 TABLET(S): 5; 325 TABLET ORAL at 17:22

## 2020-10-29 RX ADMIN — Medication 100 MILLIGRAM(S): at 16:33

## 2020-10-29 RX ADMIN — OXYCODONE AND ACETAMINOPHEN 1 TABLET(S): 5; 325 TABLET ORAL at 17:52

## 2020-10-29 RX ADMIN — TRAMADOL HYDROCHLORIDE 50 MILLIGRAM(S): 50 TABLET ORAL at 14:00

## 2020-10-29 RX ADMIN — Medication 2 SPRAY(S): at 23:08

## 2020-10-29 RX ADMIN — Medication 62.5 MILLIMOLE(S): at 16:46

## 2020-10-29 RX ADMIN — SODIUM CHLORIDE 100 MILLILITER(S): 9 INJECTION INTRAMUSCULAR; INTRAVENOUS; SUBCUTANEOUS at 12:12

## 2020-10-29 RX ADMIN — OXYCODONE HYDROCHLORIDE 10 MILLIGRAM(S): 5 TABLET ORAL at 23:00

## 2020-10-29 RX ADMIN — ATORVASTATIN CALCIUM 10 MILLIGRAM(S): 80 TABLET, FILM COATED ORAL at 21:11

## 2020-10-29 RX ADMIN — TRAMADOL HYDROCHLORIDE 50 MILLIGRAM(S): 50 TABLET ORAL at 20:40

## 2020-10-29 RX ADMIN — Medication 2 SPRAY(S): at 17:46

## 2020-10-29 RX ADMIN — Medication 50 MILLIGRAM(S): at 16:53

## 2020-10-29 RX ADMIN — OXYCODONE HYDROCHLORIDE 10 MILLIGRAM(S): 5 TABLET ORAL at 23:30

## 2020-10-29 RX ADMIN — LOSARTAN POTASSIUM 50 MILLIGRAM(S): 100 TABLET, FILM COATED ORAL at 21:14

## 2020-10-29 NOTE — PROGRESS NOTE ADULT - ASSESSMENT
50 y/o male with h/o HTN, T2DN, Hyperlipidemia found to have pituitary macroadenoma following outpatient workup for low Testosterone now s/p endoscopic TSP resection c/b CSF leak repair w/ nasal septal flap+ duragen closure       NEURO:  -neuro check q1  -follow up MRI in AM   -pain management w/ tramadol 50mg PRN  -TSP/skull base precautions: no straws, nasal instrumentation BiPAP   -DI watch  -PT/OT evaluation     PULMONARY:  saturating well on RA,   -continue to monitor on pulse o2   -incentive spirometry 10q/hr when awake     CARDIOVASCULAR:  HTN-  c/w metoprolol 50mg qd, losartan 50mg qd  SBP goal 100-150  HLD-  c/w Atorvastatin 10mg    GASTROENTEROLOGY:  bedside speech & swallow if pass can start advancing diet as tolerated.   ensure BMs w/ Miralax & senna    ENDOCRINE:  given TSP resection of pituitary will need DI watch, inform Dr. Bernal if concern and thoughts of administering DDAVP  patient was given dexamethasone intra-OP, AM cortisol reading will be inaccurate     RENAL:  -check BMP daily, obtain baseline urine osm, Na  DI watch  -strict i/o's    INFECTIOUS:  continue to monitor for fevers    HEME/ONC:  DVT ppx: will hold chemical dvt ppx in setting of recent operation. 48 y/o male with h/o HTN, T2DN, Hyperlipidemia found to have pituitary macroadenoma following outpatient workup for low Testosterone now s/p endoscopic TSP resection c/b CSF leak repair w/ nasal septal flap+ duragen closure       NEURO:  -neuro check q1  -follow up MRI in AM   -pain management w/ tramadol 50mg PRN  -TSP/skull base precautions: no straws, nasal instrumentation BiPAP   -DI watch  -PT/OT evaluation     PULMONARY:  saturating well on RA,   -continue to monitor on pulse o2   -incentive spirometry 10q/hr when awake     CARDIOVASCULAR:  HTN-  c/w metoprolol 50mg qd, losartan 50mg qd  SBP goal 100-150  HLD-  c/w Atorvastatin 10mg    GASTROENTEROLOGY:  bedside speech & swallow if pass can start advancing diet as tolerated.   ensure BMs w/ Miralax & senna    ENDOCRINE:  given TSP resection of pituitary will need DI watch, inform Dr. Bernal if concern and thoughts of administering DDAVP  patient was given dexamethasone intra-OP, AM cortisol reading will be inaccurate     T2DM  -Aa1c 7%  -initiate HISS       RENAL:  -check BMP daily, obtain baseline urine osm, Na  DI watch  -strict i/o's    INFECTIOUS:  continue to monitor for fevers    HEME/ONC:  DVT ppx: will hold chemical dvt ppx in setting of recent operation. 48 y/o male with h/o HTN, T2DN, Hyperlipidemia found to have pituitary macroadenoma following outpatient workup for low Testosterone now s/p endoscopic TSP resection c/b CSF leak repair w/ nasal septal flap+ duragen closure       NEURO:  -neuro check q1  -follow up MRI in AM   -pain management w/ tramadol 50mg PRN  -TSP/skull base precautions: no straws, nasal instrumentation BiPAP   -DI watch  - watch for CSF leak   -PT/OT evaluation     PULMONARY:  saturating well on RA,   -continue to monitor on pulse o2   pituitary precaution     CARDIOVASCULAR:  HTN-  c/w metoprolol 50mg qd, losartan 50mg qd  SBP goal 100-150  HLD-  c/w Atorvastatin 10mg    GASTROENTEROLOGY:  bedside speech & swallow if pass can start advancing diet as tolerated.   ensure BMs w/ Miralax & senna    ENDOCRINE:  given TSP resection of pituitary will need DI watch, inform Dr. Bernal if concern and thoughts of administering DDAVP  patient was given dexamethasone intra-OP, AM cortisol reading will be inaccurate     T2DM  -Aa1c 7%  -initiate HISS       RENAL:  -check BMP daily, obtain baseline urine osm, Na  DI watch  -strict i/o's    INFECTIOUS:  continue to monitor for fevers    HEME/ONC:  DVT ppx: will hold chemical dvt ppx in setting of recent operation.     35 critical care time  patient is at increased risk of ICH

## 2020-10-29 NOTE — PROGRESS NOTE ADULT - SUBJECTIVE AND OBJECTIVE BOX
ENT ISSUE/POD: TSRP POD#0    HPI: 48 y/o male with h/o HTN, T2DN, Hyperlipidemia c/o pituitary macroadenoma found upon workup for a low testosterone level, denies headaches or difficulties with his vision. Today he presents  for scheduled Endoscopic Transphenoidal Removal Pituitary Tumor POD#0. Pt. was seen post op for small amount of epistaxis from left nare. Pt. was packed in the OR with nasopore but continued to bleed from left nare intermittently. Pt. denies salty taste in mouth , headaches, N/V.          PAST MEDICAL & SURGICAL HISTORY:  Pituitary macroadenoma    History of high cholesterol    HTN (hypertension)    T2DM (type 2 diabetes mellitus)    No significant past surgical history      Allergies    No Known Allergies    Intolerances      MEDICATIONS  (STANDING):  acetaminophen  IVPB .. 1000 milliGRAM(s) IV Intermittent once  atorvastatin 10 milliGRAM(s) Oral at bedtime  bisacodyl 5 milliGRAM(s) Oral at bedtime  ceFAZolin   IVPB 2000 milliGRAM(s) IV Intermittent once  ceFAZolin   IVPB 2000 milliGRAM(s) IV Intermittent every 8 hours  chlorhexidine 4% Liquid 1 Application(s) Topical <User Schedule>  dextrose 5%. 1000 milliLiter(s) (50 mL/Hr) IV Continuous <Continuous>  dextrose 50% Injectable 12.5 Gram(s) IV Push once  dextrose 50% Injectable 25 Gram(s) IV Push once  dextrose 50% Injectable 25 Gram(s) IV Push once  influenza   Vaccine 0.5 milliLiter(s) IntraMuscular once  insulin lispro (ADMELOG) corrective regimen sliding scale   SubCutaneous three times a day before meals  insulin lispro (ADMELOG) corrective regimen sliding scale   SubCutaneous at bedtime  losartan 50 milliGRAM(s) Oral daily  metoprolol succinate ER 50 milliGRAM(s) Oral daily  senna 2 Tablet(s) Oral at bedtime  sodium chloride 0.65% Nasal 2 Spray(s) Both Nostrils four times a day    MEDICATIONS  (PRN):  dextrose 40% Gel 15 Gram(s) Oral once PRN Blood Glucose LESS THAN 70 milliGRAM(s)/deciliter  glucagon  Injectable 1 milliGRAM(s) IntraMuscular once PRN Glucose LESS THAN 70 milligrams/deciliter  guaiFENesin   Syrup  (Sugar-Free) 100 milliGRAM(s) Oral every 6 hours PRN Cough  ondansetron Injectable 4 milliGRAM(s) IV Push every 6 hours PRN Nausea and/or Vomiting  traMADol 50 milliGRAM(s) Oral every 4 hours PRN Moderate Pain (4 - 6)      Social History:denies hx of smoking, social drinker with beer, wine and liquor  Family history: No significant pertinent history in first degree relative    ROS:   ENT: all negative except as noted in HPI   Pulm: denies SOB, cough, hemoptysis  Neuro: denies numbness/tingling, loss of sensation  Endo: denies heat/cold intolerance, excessive sweating      Vital Signs Last 24 Hrs  T(C): 36.7 (29 Oct 2020 11:55), Max: 36.7 (29 Oct 2020 05:48)  T(F): 98 (29 Oct 2020 11:55), Max: 98.1 (29 Oct 2020 05:48)  HR: 100 (29 Oct 2020 17:00) (80 - 100)  BP: 131/87 (29 Oct 2020 07:18) (131/87 - 131/87)  BP(mean): --  RR: 12 (29 Oct 2020 17:00) (12 - 23)  SpO2: 96% (29 Oct 2020 17:00) (94% - 98%)     10-29    133<L>  |  101  |  15  ----------------------------<  165<H>  4.1   |  19<L>  |  0.87    Ca    8.4      29 Oct 2020 12:38  Phos  2.4     10-29  Mg     2.1     10-29         PHYSICAL EXAM:  Gen: NAD  Skin: No rashes, bruises, or lesions  Head: Normocephalic, Atraumatic  Face: no edema, erythema, or fluctuance. Parotid glands soft without mass  Eyes: no scleral injection  Nose: Right: nasopore placed in OR, unable to visualize  packing,, no active bleeding            Left: small amt. of bleeding noted, nasopore was added anteriorly to control bleeding, mustache dressing applied  Mouth: No Stridor / Drooling / Trismus.  Mucosa moist, tongue/uvula midline, oropharynx clear with no blood in oropharynx  Neck: Flat, supple, no lymphadenopathy, trachea midline, no masses  Lymphatic: No lymphadenopathy  Resp: breathing easily, no stridor  Neuro: facial nerve intact, no facial droop         ENT ISSUE/POD: TSRP with left nasoseptal flap for small CSF leak POD#0    HPI: 48 y/o male with h/o HTN, T2DN, Hyperlipidemia c/o pituitary macroadenoma found upon workup for a low testosterone level, denies headaches or difficulties with his vision. Today he presents  for scheduled TSRP with left nasoseptal flap for small CSF leak  POD#0. Pt. was seen post op for small amount of epistaxis from left nare. Pt. was packed in the OR with nasopore but continued to bleed from left nare intermittently. Pt. denies salty taste in mouth , headaches, N/V.          PAST MEDICAL & SURGICAL HISTORY:  Pituitary macroadenoma    History of high cholesterol    HTN (hypertension)    T2DM (type 2 diabetes mellitus)    No significant past surgical history      Allergies    No Known Allergies    Intolerances      MEDICATIONS  (STANDING):  acetaminophen  IVPB .. 1000 milliGRAM(s) IV Intermittent once  atorvastatin 10 milliGRAM(s) Oral at bedtime  bisacodyl 5 milliGRAM(s) Oral at bedtime  ceFAZolin   IVPB 2000 milliGRAM(s) IV Intermittent once  ceFAZolin   IVPB 2000 milliGRAM(s) IV Intermittent every 8 hours  chlorhexidine 4% Liquid 1 Application(s) Topical <User Schedule>  dextrose 5%. 1000 milliLiter(s) (50 mL/Hr) IV Continuous <Continuous>  dextrose 50% Injectable 12.5 Gram(s) IV Push once  dextrose 50% Injectable 25 Gram(s) IV Push once  dextrose 50% Injectable 25 Gram(s) IV Push once  influenza   Vaccine 0.5 milliLiter(s) IntraMuscular once  insulin lispro (ADMELOG) corrective regimen sliding scale   SubCutaneous three times a day before meals  insulin lispro (ADMELOG) corrective regimen sliding scale   SubCutaneous at bedtime  losartan 50 milliGRAM(s) Oral daily  metoprolol succinate ER 50 milliGRAM(s) Oral daily  senna 2 Tablet(s) Oral at bedtime  sodium chloride 0.65% Nasal 2 Spray(s) Both Nostrils four times a day    MEDICATIONS  (PRN):  dextrose 40% Gel 15 Gram(s) Oral once PRN Blood Glucose LESS THAN 70 milliGRAM(s)/deciliter  glucagon  Injectable 1 milliGRAM(s) IntraMuscular once PRN Glucose LESS THAN 70 milligrams/deciliter  guaiFENesin   Syrup  (Sugar-Free) 100 milliGRAM(s) Oral every 6 hours PRN Cough  ondansetron Injectable 4 milliGRAM(s) IV Push every 6 hours PRN Nausea and/or Vomiting  traMADol 50 milliGRAM(s) Oral every 4 hours PRN Moderate Pain (4 - 6)      Social History:denies hx of smoking, social drinker with beer, wine and liquor  Family history: No significant pertinent history in first degree relative    ROS:   ENT: all negative except as noted in HPI   Pulm: denies SOB, cough, hemoptysis  Neuro: denies numbness/tingling, loss of sensation  Endo: denies heat/cold intolerance, excessive sweating      Vital Signs Last 24 Hrs  T(C): 36.7 (29 Oct 2020 11:55), Max: 36.7 (29 Oct 2020 05:48)  T(F): 98 (29 Oct 2020 11:55), Max: 98.1 (29 Oct 2020 05:48)  HR: 100 (29 Oct 2020 17:00) (80 - 100)  BP: 131/87 (29 Oct 2020 07:18) (131/87 - 131/87)  BP(mean): --  RR: 12 (29 Oct 2020 17:00) (12 - 23)  SpO2: 96% (29 Oct 2020 17:00) (94% - 98%)     10-29    133<L>  |  101  |  15  ----------------------------<  165<H>  4.1   |  19<L>  |  0.87    Ca    8.4      29 Oct 2020 12:38  Phos  2.4     10-29  Mg     2.1     10-29         PHYSICAL EXAM:  Gen: NAD  Skin: No rashes, bruises, or lesions  Head: Normocephalic, Atraumatic  Face: no edema, erythema, or fluctuance. Parotid glands soft without mass  Eyes: no scleral injection  Nose: Right: nasopore placed in OR, unable to visualize  packing,, no active bleeding            Left: small amt. of bleeding noted, nasopore was added anteriorly to control bleeding, mustache dressing applied  Mouth: No Stridor / Drooling / Trismus.  Mucosa moist, tongue/uvula midline, oropharynx clear with no blood in oropharynx  Neck: Flat, supple, no lymphadenopathy, trachea midline, no masses  Lymphatic: No lymphadenopathy  Resp: breathing easily, no stridor  Neuro: facial nerve intact, no facial droop

## 2020-10-29 NOTE — PROVIDER CONTACT NOTE (OTHER) - ASSESSMENT
Increased blood drainage from left nostril, no yellow halo noted on mustache gauze, no metallic taste noted, no change in mental status.

## 2020-10-29 NOTE — PROGRESS NOTE ADULT - ASSESSMENT
48 y/o male with h/o HTN, T2DN, Hyperlipidemia c/o pituitary macroadenoma found upon workup for a low testosterone level, denies headaches or difficulties with his vision. Today he presents  for scheduled Endoscopic Transphenoidal Removal Pituitary Tumor.  Post-op pt. c/o bleeding from left nare . The bleeding was reinforced with nasopore and the bleeding was controlled.

## 2020-10-29 NOTE — PATIENT PROFILE ADULT - NSTRANSFERBELONGINGSRESP_GEN_A_NUR
Surgery Post-Op Day #1 Note    CC: perineal berto's gangrene    Procedure: 1) Excisional debridement of perineum (15 x 6 x 3 cm), 2) Right gracilis myofascial flap for perineal reconstruction, 3) Complex closure of right labia (8.5 cm)    SUBJECTIVE:   Tmn: to 102 yesterday evening, fever workup semi-sent. Otherwise hemodynamically stable, pain controlled, reports soreness in perineal area. Denies nausea or emesis. Tolerating diet. Block in place    ROS: A 14 point review of systems was conducted, significant findings as noted in HPI. All other systems negative. OBJECTIVE:     Exam:   Vitals:    06/08/19 0400 06/08/19 0500 06/08/19 0600 06/08/19 0700   BP: 95/80 93/61 110/66 107/63   Pulse: 77 74 72 78   Resp: 15 13 16 11   Temp: 98.9 °F (37.2 °C)   99.4 °F (37.4 °C)   TempSrc: Oral   Oral   SpO2: 94%   93%   Weight:  136 lb 3.9 oz (61.8 kg)     Height:           General appearance: alert, no acute distress, grooming appropriate  Neuro: A&Ox3, no focal deficits, sensation intact  HEENT: trachea midline  Chest/Lungs: normal effort, no accessory muscle use, on RA  Cardiovascular: RRR  Abdomen: soft, non tender, non distended, ostomy pink, viable, some slough at anterior and distal aspects of ostomy, having solid output. Perineum: labial flap incisions C/D/I, area of wet to dry packing adjacent to rectum, mild serosanguinous drainage from distal incision site to be expected. Small area of tenderness and mild discoloration consistent with hematoma on left inner thigh, 2 RANDA drains in place - both serosanguinous.   : Block in place - clear yellow urine  Extremities: no edema, no cyanosis        ASSESSMENT/PLAN:   This is a 58 y.o. female w/ necrotizing fasciitis of the perineum s/p diverting colostomy on 5/30, s/p initial debridement on 5/28, again on 6/4 taken back for 1) Excisional debridement of perineum (15 x 6 x 3 cm), 2) Right gracilis myofascial flap for perineal reconstruction, 3) Complex closure of right labia (8.5 cm) POD #1    - febrile to 102 overnight, blood cultures sent, CXR showed increased atelectasis mid right lung, needs aggressive IS and deep breathing today. Will add EZ PAP and acapella. - held off on sending UA due to valverde in place, will discuss with staff if ok to replace, appeared to be a difficulty valverde since wound is directly adjacent to area, concern about not being able to replace due to mild edema.  - Diet: tolerating general diet, continue to encourage protein intake with supplements to improve nutrition and potential wound healing. Will check nutritional markers monday  - strict bedrest till tomorrow please. - ppx: SCDs and lovenox  - wound care: continue to change wet to dry dressing BID with normal saline soaked guaze. Please apply bacitracin to incision sites TID. Strip drains TID and PRN. - ostomy management per gen surg      Rosario Yeh MD  Gen.  Surgery Resident PGY1  06/08/19  7:58 AM  [phone] yes

## 2020-10-29 NOTE — DISCHARGE NOTE NURSING/CASE MANAGEMENT/SOCIAL WORK - PATIENT PORTAL LINK FT
You can access the FollowMyHealth Patient Portal offered by [facility] by registering at the following website: [email or web address]. By joining [employer]’s FollowMyHealth portal, you will also be able to view your health information using other applications (apps) compatible with our system.

## 2020-10-29 NOTE — PROGRESS NOTE ADULT - SUBJECTIVE AND OBJECTIVE BOX
INTERVAL HISTORY: HPI:  50 y/o male with h/o HTN, T2DN, Hyperlipidemia c/o pituitary macroadenoma found upon workup for a low testosterone level, denies headaches or difficulties with his vision. Today he presents to [facility] for scheduled Endoscopic Transphenoidal Removal Pituitary Tumor on 10/29/20.         MEDICATIONS  (STANDING):  acetaminophen  IVPB .. 1000 milliGRAM(s) IV Intermittent once  atorvastatin 10 milliGRAM(s) Oral at bedtime  bisacodyl 5 milliGRAM(s) Oral at bedtime  ceFAZolin   IVPB 2000 milliGRAM(s) IV Intermittent once  ceFAZolin   IVPB 2000 milliGRAM(s) IV Intermittent every 8 hours  dextrose 5%. 1000 milliLiter(s) (50 mL/Hr) IV Continuous <Continuous>  dextrose 50% Injectable 12.5 Gram(s) IV Push once  dextrose 50% Injectable 25 Gram(s) IV Push once  dextrose 50% Injectable 25 Gram(s) IV Push once  insulin lispro (ADMELOG) corrective regimen sliding scale   SubCutaneous three times a day before meals  insulin lispro (ADMELOG) corrective regimen sliding scale   SubCutaneous at bedtime  losartan 50 milliGRAM(s) Oral daily  metoprolol succinate ER 50 milliGRAM(s) Oral daily  senna 2 Tablet(s) Oral at bedtime  sodium chloride 0.9%. 1000 milliLiter(s) (100 mL/Hr) IV Continuous <Continuous>    MEDICATIONS  (PRN):  dextrose 40% Gel 15 Gram(s) Oral once PRN Blood Glucose LESS THAN 70 milliGRAM(s)/deciliter  glucagon  Injectable 1 milliGRAM(s) IntraMuscular once PRN Glucose LESS THAN 70 milligrams/deciliter  guaiFENesin   Syrup  (Sugar-Free) 100 milliGRAM(s) Oral every 6 hours PRN Cough  ondansetron Injectable 4 milliGRAM(s) IV Push every 6 hours PRN Nausea and/or Vomiting  traMADol 50 milliGRAM(s) Oral every 4 hours PRN Moderate Pain (4 - 6)      Drug Dosing Weight  Height (cm): 177.8 (29 Oct 2020 07:18)  Weight (kg): 98 (29 Oct 2020 07:18)  BMI (kg/m2): 31 (29 Oct 2020 07:18)  BSA (m2): 2.16 (29 Oct 2020 07:18)    CENTRAL LINE: [ ] YES [x ] NO  LOCATION:   DATE INSERTED:  REMOVE: [ ] YES [ ] NO  EXPLAIN:    KEITA: [ ] YES [x ] NO    DATE INSERTED:  REMOVE: [ ] YES [ ] NO  EXPLAIN:    A-LINE: [x ] YES [ ] NO  LOCATION:   DATE INSERTED:  REMOVE: [ ] YES [ ] NO  EXPLAIN:    PAST MEDICAL & SURGICAL HISTORY:  Pituitary macroadenoma    History of high cholesterol    HTN (hypertension)    T2DM (type 2 diabetes mellitus)    No significant past surgical history        REVIEW OF SYSTEMS: [ ] Unable to Assess due to neurologic exam   [ ] All ROS addressed below are non-contributory, except:  Neuro: [ ] Headache [ ] Back pain [ ] Numbness [ ] Weakness [ ] Ataxia [ ] Dizziness [ ] Aphasia [ ] Dysarthria [ ] Visual disturbance  Resp: [ ] Shortness of breath/dyspnea, [ ] Orthopnea [ ] Cough  CV: [ ] Chest pain [ ] Palpitation [ ] Lightheadedness [ ] Syncope  Renal: [ ] Thirst [ ] Edema  GI: [ ] Nausea [ ] Emesis [ ] Abdominal pain [ ] Constipation [ ] Diarrhea  Hem: [ ] Hematemesis [ ] bright red blood per rectum  ID: [ ] Fever [ ] Chills [ ] Dysuria  ENT: [ ] Rhinorrhea      ICU Vital Signs Last 24 Hrs  T(C): 36.7 (29 Oct 2020 11:55), Max: 36.7 (29 Oct 2020 05:48)  T(F): 98 (29 Oct 2020 11:55), Max: 98.1 (29 Oct 2020 05:48)  HR: 92 (29 Oct 2020 12:05) (80 - 93)  BP: 131/87 (29 Oct 2020 07:18) (131/87 - 131/87)  BP(mean): --  ABP: 148/81 (29 Oct 2020 12:05) (117/72 - 150/86)  ABP(mean): 105 (29 Oct 2020 12:05) (90 - 109)  RR: 17 (29 Oct 2020 12:05) (14 - 17)  SpO2: 96% (29 Oct 2020 12:05) (95% - 98%)          I&O's Detail    PHYSICAL EXAM:    General: No Acute Distress   Neurological: Awake, alert oriented to person, place and time, Following Commands, PERRL, EOMI, Face Symmetrical, Speech Fluent, Moving all extremities, Muscle Strength normal in all four extremities, No Drift, Sensation to Light Touch Intact  Pulmonary: Clear to Auscultation, No Rales, No Rhonchi, No Wheezes   Cardiovascular: S1, S2, Regular Rate and Rhythm   Gastrointestinal: Soft, Nontender, Nondistended   Extremities: No calf tenderness           LABS:    presurgical labs reviewed   a1c 7%      RADIOLOGY & ADDITIONAL STUDIES:  mr< from: MR Head w/wo IV Cont (10.23.20 @ 10:52) >  FINDINGS:  There is a sellar and suprasellar mass measuring  1.8 cm cc x 1.7 cm transverse x 1.2 cm AP. There is extension into the left cavernous sinus. The infundibulum is noted posteriorly along the mass and is deviated  towards the right. There is mass effect upon the optic chiasm and optic nerves. The flow voids of the carotid arteries are normal.    No hydrocephalus, midline shift or extra-axial collections are identified. No abnormal enhancement within the remainder of the brain is identified. A few scattered nonspecific foci of elevated signal intensity noted within the white matter on T2-weighted images.    A left maxillary sinus polyp versus retention cyst is present. A trace bilateral mastoid effusions are present.    Impression:    Sellar and suprasellar mass likely macroadenoma. Mass effect upon the optic chiasm optic nerves and extension into the left cavernous sinus.    < end of copied text >   INTERVAL HISTORY: HPI:  50 y/o male with h/o HTN, T2DN, Hyperlipidemia c/o pituitary macroadenoma found upon workup for a low testosterone level, denies headaches or difficulties with his vision. Today he presents to [facility] for scheduled Endoscopic Transphenoidal Removal Pituitary Tumor on 10/29/20.         MEDICATIONS  (STANDING):  acetaminophen  IVPB .. 1000 milliGRAM(s) IV Intermittent once  atorvastatin 10 milliGRAM(s) Oral at bedtime  bisacodyl 5 milliGRAM(s) Oral at bedtime  ceFAZolin   IVPB 2000 milliGRAM(s) IV Intermittent once  ceFAZolin   IVPB 2000 milliGRAM(s) IV Intermittent every 8 hours  dextrose 5%. 1000 milliLiter(s) (50 mL/Hr) IV Continuous <Continuous>  dextrose 50% Injectable 12.5 Gram(s) IV Push once  dextrose 50% Injectable 25 Gram(s) IV Push once  dextrose 50% Injectable 25 Gram(s) IV Push once  insulin lispro (ADMELOG) corrective regimen sliding scale   SubCutaneous three times a day before meals  insulin lispro (ADMELOG) corrective regimen sliding scale   SubCutaneous at bedtime  losartan 50 milliGRAM(s) Oral daily  metoprolol succinate ER 50 milliGRAM(s) Oral daily  senna 2 Tablet(s) Oral at bedtime  sodium chloride 0.9%. 1000 milliLiter(s) (100 mL/Hr) IV Continuous <Continuous>    MEDICATIONS  (PRN):  dextrose 40% Gel 15 Gram(s) Oral once PRN Blood Glucose LESS THAN 70 milliGRAM(s)/deciliter  glucagon  Injectable 1 milliGRAM(s) IntraMuscular once PRN Glucose LESS THAN 70 milligrams/deciliter  guaiFENesin   Syrup  (Sugar-Free) 100 milliGRAM(s) Oral every 6 hours PRN Cough  ondansetron Injectable 4 milliGRAM(s) IV Push every 6 hours PRN Nausea and/or Vomiting  traMADol 50 milliGRAM(s) Oral every 4 hours PRN Moderate Pain (4 - 6)      Drug Dosing Weight  Height (cm): 177.8 (29 Oct 2020 07:18)  Weight (kg): 98 (29 Oct 2020 07:18)  BMI (kg/m2): 31 (29 Oct 2020 07:18)  BSA (m2): 2.16 (29 Oct 2020 07:18)    CENTRAL LINE: [ ] YES [x ] NO  LOCATION:   DATE INSERTED:  REMOVE: [ ] YES [ ] NO  EXPLAIN:    KEITA: [ ] YES [x ] NO    DATE INSERTED:  REMOVE: [ ] YES [ ] NO  EXPLAIN:    A-LINE: [x ] YES [ ] NO  LOCATION:   DATE INSERTED:  REMOVE: [ ] YES [ ] NO  EXPLAIN:    PAST MEDICAL & SURGICAL HISTORY:  Pituitary macroadenoma    History of high cholesterol    HTN (hypertension)    T2DM (type 2 diabetes mellitus)    No significant past surgical history        REVIEW OF SYSTEMS: [ ] Unable to Assess due to neurologic exam   [ x] All ROS addressed below are non-contributory, except:  Neuro: [ ] Headache [ ] Back pain [ ] Numbness [ ] Weakness [ ] Ataxia [ ] Dizziness [ ] Aphasia [ ] Dysarthria [ ] Visual disturbance  Resp: [ ] Shortness of breath/dyspnea, [ ] Orthopnea [ ] Cough  CV: [ ] Chest pain [ ] Palpitation [ ] Lightheadedness [ ] Syncope  Renal: [ ] Thirst [ ] Edema  GI: [ ] Nausea [ ] Emesis [ ] Abdominal pain [ ] Constipation [ ] Diarrhea  Hem: [ ] Hematemesis [ ] bright red blood per rectum  ID: [ ] Fever [ ] Chills [ ] Dysuria  ENT: [ ] Rhinorrhea      ICU Vital Signs Last 24 Hrs  T(C): 36.7 (29 Oct 2020 11:55), Max: 36.7 (29 Oct 2020 05:48)  T(F): 98 (29 Oct 2020 11:55), Max: 98.1 (29 Oct 2020 05:48)  HR: 92 (29 Oct 2020 12:05) (80 - 93)  BP: 131/87 (29 Oct 2020 07:18) (131/87 - 131/87)  BP(mean): --  ABP: 148/81 (29 Oct 2020 12:05) (117/72 - 150/86)  ABP(mean): 105 (29 Oct 2020 12:05) (90 - 109)  RR: 17 (29 Oct 2020 12:05) (14 - 17)  SpO2: 96% (29 Oct 2020 12:05) (95% - 98%)          I&O's Detail    PHYSICAL EXAM:    General: No Acute Distress   Neurological: Awake, alert oriented to person, place and time, Following Commands, PERRL, EOMI, Face Symmetrical, Speech Fluent, Moving all extremities, Muscle Strength normal in all four extremities, No Drift, Sensation to Light Touch Intact  Pulmonary: Clear to Auscultation, No Rales, No Rhonchi, No Wheezes   Cardiovascular: S1, S2, Regular Rate and Rhythm   Gastrointestinal: Soft, Nontender, Nondistended   Extremities: No calf tenderness           LABS:    presurgical labs reviewed   a1c 7%      RADIOLOGY & ADDITIONAL STUDIES:  mr< from: MR Head w/wo IV Cont (10.23.20 @ 10:52) >  FINDINGS:  There is a sellar and suprasellar mass measuring  1.8 cm cc x 1.7 cm transverse x 1.2 cm AP. There is extension into the left cavernous sinus. The infundibulum is noted posteriorly along the mass and is deviated  towards the right. There is mass effect upon the optic chiasm and optic nerves. The flow voids of the carotid arteries are normal.    No hydrocephalus, midline shift or extra-axial collections are identified. No abnormal enhancement within the remainder of the brain is identified. A few scattered nonspecific foci of elevated signal intensity noted within the white matter on T2-weighted images.    A left maxillary sinus polyp versus retention cyst is present. A trace bilateral mastoid effusions are present.    Impression:    Sellar and suprasellar mass likely macroadenoma. Mass effect upon the optic chiasm optic nerves and extension into the left cavernous sinus.    < end of copied text >

## 2020-10-29 NOTE — PROGRESS NOTE ADULT - SUBJECTIVE AND OBJECTIVE BOX
NSCU ATTENDING -- ADDITIONAL PROGRESS NOTE    Nighttime rounds were performed -- please refer to earlier Progress Note for HPI details.    T(C): 36.5 (10-29-20 @ 19:00), Max: 36.7 (10-29-20 @ 05:48)  HR: 89 (10-29-20 @ 21:15) (80 - 100)  BP: 131/87 (10-29-20 @ 07:18) (131/87 - 131/87)  RR: 16 (10-29-20 @ 21:15) (12 - 23)  SpO2: 96% (10-29-20 @ 21:15) (93% - 98%)  Wt(kg): --    Relevant labwork and imaging reviewed.    Intact.  DI watch.  Likely transfer to floor in AM.

## 2020-10-29 NOTE — BRIEF OPERATIVE NOTE - OPERATION/FINDINGS
endoscopic endonasal transsphenoidal for pituitary mass   septoplasty  nasoseptal flap  repair small CSF leak

## 2020-10-29 NOTE — PROVIDER CONTACT NOTE (OTHER) - SITUATION
UO for current hour 225 and pt mustache dressing saturated with blood, changed within the last hour.

## 2020-10-30 LAB
ANION GAP SERPL CALC-SCNC: 13 MMOL/L — SIGNIFICANT CHANGE UP (ref 5–17)
ANION GAP SERPL CALC-SCNC: 17 MMOL/L — SIGNIFICANT CHANGE UP (ref 5–17)
BUN SERPL-MCNC: 22 MG/DL — SIGNIFICANT CHANGE UP (ref 7–23)
BUN SERPL-MCNC: 22 MG/DL — SIGNIFICANT CHANGE UP (ref 7–23)
CALCIUM SERPL-MCNC: 8.8 MG/DL — SIGNIFICANT CHANGE UP (ref 8.4–10.5)
CALCIUM SERPL-MCNC: 8.9 MG/DL — SIGNIFICANT CHANGE UP (ref 8.4–10.5)
CHLORIDE SERPL-SCNC: 100 MMOL/L — SIGNIFICANT CHANGE UP (ref 96–108)
CHLORIDE SERPL-SCNC: 98 MMOL/L — SIGNIFICANT CHANGE UP (ref 96–108)
CO2 SERPL-SCNC: 18 MMOL/L — LOW (ref 22–31)
CO2 SERPL-SCNC: 23 MMOL/L — SIGNIFICANT CHANGE UP (ref 22–31)
CORTIS AM PEAK SERPL-MCNC: 27.4 UG/DL — HIGH (ref 6–18.4)
CREAT SERPL-MCNC: 0.85 MG/DL — SIGNIFICANT CHANGE UP (ref 0.5–1.3)
CREAT SERPL-MCNC: 0.91 MG/DL — SIGNIFICANT CHANGE UP (ref 0.5–1.3)
GLUCOSE BLDC GLUCOMTR-MCNC: 126 MG/DL — HIGH (ref 70–99)
GLUCOSE BLDC GLUCOMTR-MCNC: 126 MG/DL — HIGH (ref 70–99)
GLUCOSE BLDC GLUCOMTR-MCNC: 129 MG/DL — HIGH (ref 70–99)
GLUCOSE BLDC GLUCOMTR-MCNC: 158 MG/DL — HIGH (ref 70–99)
GLUCOSE SERPL-MCNC: 134 MG/DL — HIGH (ref 70–99)
GLUCOSE SERPL-MCNC: 173 MG/DL — HIGH (ref 70–99)
MAGNESIUM SERPL-MCNC: 2.4 MG/DL — SIGNIFICANT CHANGE UP (ref 1.6–2.6)
MAGNESIUM SERPL-MCNC: 2.4 MG/DL — SIGNIFICANT CHANGE UP (ref 1.6–2.6)
PHOSPHATE SERPL-MCNC: 1.9 MG/DL — LOW (ref 2.5–4.5)
PHOSPHATE SERPL-MCNC: 3.5 MG/DL — SIGNIFICANT CHANGE UP (ref 2.5–4.5)
POTASSIUM SERPL-MCNC: 4.1 MMOL/L — SIGNIFICANT CHANGE UP (ref 3.5–5.3)
POTASSIUM SERPL-MCNC: 4.3 MMOL/L — SIGNIFICANT CHANGE UP (ref 3.5–5.3)
POTASSIUM SERPL-SCNC: 4.1 MMOL/L — SIGNIFICANT CHANGE UP (ref 3.5–5.3)
POTASSIUM SERPL-SCNC: 4.3 MMOL/L — SIGNIFICANT CHANGE UP (ref 3.5–5.3)
SODIUM SERPL-SCNC: 133 MMOL/L — LOW (ref 135–145)
SODIUM SERPL-SCNC: 136 MMOL/L — SIGNIFICANT CHANGE UP (ref 135–145)

## 2020-10-30 PROCEDURE — 99024 POSTOP FOLLOW-UP VISIT: CPT

## 2020-10-30 PROCEDURE — 99233 SBSQ HOSP IP/OBS HIGH 50: CPT

## 2020-10-30 PROCEDURE — 70553 MRI BRAIN STEM W/O & W/DYE: CPT | Mod: 26

## 2020-10-30 RX ORDER — METOCLOPRAMIDE HCL 10 MG
10 TABLET ORAL ONCE
Refills: 0 | Status: COMPLETED | OUTPATIENT
Start: 2020-10-30 | End: 2020-10-30

## 2020-10-30 RX ORDER — FENTANYL CITRATE 50 UG/ML
25 INJECTION INTRAVENOUS ONCE
Refills: 0 | Status: DISCONTINUED | OUTPATIENT
Start: 2020-10-30 | End: 2020-10-30

## 2020-10-30 RX ORDER — ACETAMINOPHEN 500 MG
1000 TABLET ORAL ONCE
Refills: 0 | Status: COMPLETED | OUTPATIENT
Start: 2020-10-30 | End: 2020-10-30

## 2020-10-30 RX ORDER — ENOXAPARIN SODIUM 100 MG/ML
40 INJECTION SUBCUTANEOUS
Refills: 0 | Status: DISCONTINUED | OUTPATIENT
Start: 2020-10-30 | End: 2020-11-05

## 2020-10-30 RX ADMIN — SENNA PLUS 2 TABLET(S): 8.6 TABLET ORAL at 21:11

## 2020-10-30 RX ADMIN — TRAMADOL HYDROCHLORIDE 50 MILLIGRAM(S): 50 TABLET ORAL at 14:02

## 2020-10-30 RX ADMIN — Medication 2 SPRAY(S): at 11:11

## 2020-10-30 RX ADMIN — TRAMADOL HYDROCHLORIDE 50 MILLIGRAM(S): 50 TABLET ORAL at 09:00

## 2020-10-30 RX ADMIN — TRAMADOL HYDROCHLORIDE 50 MILLIGRAM(S): 50 TABLET ORAL at 08:14

## 2020-10-30 RX ADMIN — Medication 50 MILLIGRAM(S): at 05:59

## 2020-10-30 RX ADMIN — Medication 2 SPRAY(S): at 05:59

## 2020-10-30 RX ADMIN — FENTANYL CITRATE 25 MICROGRAM(S): 50 INJECTION INTRAVENOUS at 18:30

## 2020-10-30 RX ADMIN — ONDANSETRON 4 MILLIGRAM(S): 8 TABLET, FILM COATED ORAL at 01:00

## 2020-10-30 RX ADMIN — Medication 2 SPRAY(S): at 18:05

## 2020-10-30 RX ADMIN — ONDANSETRON 4 MILLIGRAM(S): 8 TABLET, FILM COATED ORAL at 15:00

## 2020-10-30 RX ADMIN — ATORVASTATIN CALCIUM 10 MILLIGRAM(S): 80 TABLET, FILM COATED ORAL at 21:11

## 2020-10-30 RX ADMIN — ENOXAPARIN SODIUM 40 MILLIGRAM(S): 100 INJECTION SUBCUTANEOUS at 18:10

## 2020-10-30 RX ADMIN — FENTANYL CITRATE 25 MICROGRAM(S): 50 INJECTION INTRAVENOUS at 15:00

## 2020-10-30 RX ADMIN — FENTANYL CITRATE 25 MICROGRAM(S): 50 INJECTION INTRAVENOUS at 18:45

## 2020-10-30 RX ADMIN — TRAMADOL HYDROCHLORIDE 50 MILLIGRAM(S): 50 TABLET ORAL at 18:10

## 2020-10-30 RX ADMIN — LOSARTAN POTASSIUM 50 MILLIGRAM(S): 100 TABLET, FILM COATED ORAL at 05:59

## 2020-10-30 RX ADMIN — Medication 1000 MILLIGRAM(S): at 20:40

## 2020-10-30 RX ADMIN — Medication 5 MILLIGRAM(S): at 21:11

## 2020-10-30 RX ADMIN — TRAMADOL HYDROCHLORIDE 50 MILLIGRAM(S): 50 TABLET ORAL at 15:00

## 2020-10-30 RX ADMIN — Medication 10 MILLIGRAM(S): at 20:00

## 2020-10-30 RX ADMIN — FENTANYL CITRATE 25 MICROGRAM(S): 50 INJECTION INTRAVENOUS at 15:30

## 2020-10-30 RX ADMIN — Medication 400 MILLIGRAM(S): at 20:10

## 2020-10-30 RX ADMIN — Medication 2: at 08:08

## 2020-10-30 RX ADMIN — Medication 100 MILLIGRAM(S): at 01:33

## 2020-10-30 NOTE — PHYSICAL THERAPY INITIAL EVALUATION ADULT - PERTINENT HX OF CURRENT PROBLEM, REHAB EVAL
50 y/o male with h/o HTN, T2DN, Hyperlipidemia found to have pituitary macroadenoma following outpatient workup for low Testosterone now s/p endoscopic TSP resection c/b CSF leak repair w/ nasal septal flap+ duragen closure

## 2020-10-30 NOTE — PROGRESS NOTE ADULT - SUBJECTIVE AND OBJECTIVE BOX
Patient seen and examined at bedside.    --Anticoagulation--    T(C): 36.6 (10-30-20 @ 03:00), Max: 36.7 (10-29-20 @ 05:48)  HR: 84 (10-30-20 @ 03:00) (77 - 100)  BP: 131/87 (10-29-20 @ 07:18) (131/87 - 131/87)  RR: 12 (10-30-20 @ 03:00) (12 - 23)  SpO2: 93% (10-30-20 @ 03:00) (93% - 98%)  Wt(kg): --    Exam: AAOx3, FC, KIMBALL 5/5, VFF, mustache dressing intact with minimal serous bloody strikethrough

## 2020-10-30 NOTE — PHYSICAL THERAPY INITIAL EVALUATION ADULT - ASR WT BEARING STATUS EVAL
CONT: MRI 10/29/20 - Since the prior examination of 10/20/2020 there has been transsphenoidal resection of the large pituitary macroadenoma. The residual soft tissue in the sella and suprasellar region appears of similar size with only minimal peripheral enhancement consistent with postoperative resection without interval loss of height. There is no change in mild mass effect on the optic chiasm./no weight-bearing restrictions

## 2020-10-30 NOTE — PHYSICAL THERAPY INITIAL EVALUATION ADULT - ADDITIONAL COMMENTS
per  note, patient lives in a private home with 2 steps to enter, with spouse Molly Hendricks  [phone] and 2 sons. Patient reports being independent with ADLs and ambulation prior to admission. Prior to admission pt reports being independent of all ADL's & functional mobility without AD. Pt has 1 step to enter no rail, pt has 1 flight (10-12) to bedroom, bathroom, additional flight to enter office. (+) tub, (-) grab bars. (+) R hand dominant. (+) driving. Pt works for [employer] company. (-) glasses.

## 2020-10-30 NOTE — PROGRESS NOTE ADULT - ASSESSMENT
48 y/o male with h/o HTN, T2DN, Hyperlipidemia found to have pituitary macroadenoma following outpatient workup for low Testosterone now s/p endoscopic TSP resection c/b CSF leak repair w/ nasal septal flap+ duragen closure       NEURO:  -neuro check q1  -follow up MRI in AM   -pain management w/ tramadol 50mg PRN  -TSP/skull base precautions: no straws, nasal instrumentation BiPAP   -DI watch  - watch for CSF leak   -PT/OT evaluation     PULMONARY:  saturating well on RA,   -continue to monitor on pulse o2   pituitary precaution     CARDIOVASCULAR:  HTN-  c/w metoprolol 50mg qd, losartan 50mg qd  SBP goal 100-150  HLD-  c/w Atorvastatin 10mg    GASTROENTEROLOGY:  bedside speech & swallow if pass can start advancing diet as tolerated.   ensure BMs w/ Miralax & senna    ENDOCRINE:  given TSP resection of pituitary will need DI watch, inform Dr. Bernal if concern and thoughts of administering DDAVP  patient was given dexamethasone intra-OP, AM cortisol reading will be inaccurate     T2DM  -Aa1c 7%  -initiate HISS       RENAL:  -check BMP daily, obtain baseline urine osm, Na  DI watch  -strict i/o's    INFECTIOUS:  continue to monitor for fevers    HEME/ONC:  DVT ppx: will hold chemical dvt ppx in setting of recent operation.     35 critical care time  patient is at increased risk of ICH 50 y/o male with h/o HTN, T2DN, Hyperlipidemia found to have pituitary macroadenoma following outpatient workup for low Testosterone now s/p endoscopic TSP resection c/b CSF leak repair w/ nasal septal flap+ duragen closure       NEURO:  -neuro check q4  -follow up MRI today morning   -pain management w/ tramadol 50mg PRN  -TSP/skull base precautions: no straws, nasal instrumentation BiPAP   -DI watch   - watch for CSF leak   -PT/OT evaluation     PULMONARY:  saturating well on RA   -continue to monitor on pulse o2   pituitary precaution     CARDIOVASCULAR:  HTN-  c/w metoprolol 50mg qd, losartan 50mg qd  SBP goal 100-150  HLD-  c/w Atorvastatin 10mg    GASTROENTEROLOGY:  regular diet   ensure BMs w/ Miralax & senna    ENDOCRINE:  given TSP resection of pituitary will need DI watch  if UO> 300 ml in 2 consecutive hours , BMP, urine SG   patient was given dexamethasone intra-OP   AM cortisol for 3 days, pending today's results     T2DM  -Aa1c 7%  -initiate HISS       RENAL:  -check BMP daily  d/c gilbert   DI watch  -strict i/o's      INFECTIOUS:  continue to monitor for fevers    HEME/ONC:  DVT ppx: lovenox today if MRI shows no bleed     not critical

## 2020-10-30 NOTE — PROGRESS NOTE ADULT - SUBJECTIVE AND OBJECTIVE BOX
ENT ISSUE/POD: TSRP with left nasoseptal flap for small CSF leak POD#1    HPI: 48 y/o male with h/o HTN, T2DN, Hyperlipidemia c/o pituitary macroadenoma found upon workup for a low testosterone level, denies headaches or difficulties with his vision. Today he presents  for scheduled TSRP with left nasoseptal flap for small CSF leak  POD#1. Pt. was seen post op for small amount of epistaxis from left nare. Pt. was packed in the OR with nasopore but continued to bleed from left nare intermittently controlled with a small amount of nasopore. Pt. denies salty taste in mouth , headaches, N/V.        PAST MEDICAL & SURGICAL HISTORY:  Pituitary macroadenoma    History of high cholesterol    HTN (hypertension)    T2DM (type 2 diabetes mellitus)    No significant past surgical history      Allergies    No Known Allergies    Intolerances      MEDICATIONS  (STANDING):  atorvastatin 10 milliGRAM(s) Oral at bedtime  bisacodyl 5 milliGRAM(s) Oral at bedtime  ceFAZolin   IVPB 2000 milliGRAM(s) IV Intermittent once  chlorhexidine 4% Liquid 1 Application(s) Topical <User Schedule>  dextrose 5%. 1000 milliLiter(s) (50 mL/Hr) IV Continuous <Continuous>  dextrose 50% Injectable 12.5 Gram(s) IV Push once  dextrose 50% Injectable 25 Gram(s) IV Push once  dextrose 50% Injectable 25 Gram(s) IV Push once  influenza   Vaccine 0.5 milliLiter(s) IntraMuscular once  insulin lispro (ADMELOG) corrective regimen sliding scale   SubCutaneous three times a day before meals  insulin lispro (ADMELOG) corrective regimen sliding scale   SubCutaneous at bedtime  losartan 50 milliGRAM(s) Oral daily  metoprolol succinate ER 50 milliGRAM(s) Oral daily  senna 2 Tablet(s) Oral at bedtime  sodium chloride 0.65% Nasal 2 Spray(s) Both Nostrils four times a day    MEDICATIONS  (PRN):  dextrose 40% Gel 15 Gram(s) Oral once PRN Blood Glucose LESS THAN 70 milliGRAM(s)/deciliter  glucagon  Injectable 1 milliGRAM(s) IntraMuscular once PRN Glucose LESS THAN 70 milligrams/deciliter  guaiFENesin   Syrup  (Sugar-Free) 100 milliGRAM(s) Oral every 6 hours PRN Cough  ondansetron Injectable 4 milliGRAM(s) IV Push every 6 hours PRN Nausea and/or Vomiting  traMADol 50 milliGRAM(s) Oral every 4 hours PRN Moderate Pain (4 - 6)      Social History: see consult    Family history: see consult    ROS:   ENT: all negative except as noted in HPI   Pulm: denies SOB, cough, hemoptysis  Neuro: denies numbness/tingling, loss of sensation  Endo: denies heat/cold intolerance, excessive sweating      Vital Signs Last 24 Hrs  T(C): 36.4 (30 Oct 2020 07:00), Max: 36.7 (29 Oct 2020 11:55)  T(F): 97.6 (30 Oct 2020 07:00), Max: 98 (29 Oct 2020 11:55)  HR: 92 (30 Oct 2020 07:00) (77 - 100)  BP: --  BP(mean): --  RR: 14 (30 Oct 2020 07:00) (12 - 23)  SpO2: 93% (30 Oct 2020 07:00) (93% - 96%)                          15.4   14.08 )-----------( 234      ( 29 Oct 2020 20:22 )             46.0    10-30    133<L>  |  98  |  22  ----------------------------<  173<H>  4.3   |  18<L>  |  0.91    Ca    8.9      30 Oct 2020 06:55  Phos  3.5     10-30  Mg     2.4     10-30         PHYSICAL EXAM:  Gen: NAD  Skin: No rashes, bruises, or lesions  Head: Normocephalic, Atraumatic  Face: no edema, erythema, or fluctuance. Parotid glands soft without mass  Eyes: no scleral injection  Nose: Right: nasopore placed in OR, unable to visualize  packing,, no active bleeding            Left: small amt. of bleeding noted, nasopore was added anteriorly to control bleeding, mustache dressing applied  Mouth: No Stridor / Drooling / Trismus.  Mucosa moist, tongue/uvula midline, oropharynx clear with no blood in oropharynx  Neck: Flat, supple, no lymphadenopathy, trachea midline, no masses  Lymphatic: No lymphadenopathy  Resp: breathing easily, no stridor  Neuro: facial nerve intact, no facial droop

## 2020-10-30 NOTE — PROGRESS NOTE ADULT - ASSESSMENT
48 y/o male with h/o HTN, T2DN, Hyperlipidemia c/o pituitary macroadenoma found upon workup for a low testosterone level, denies headaches or difficulties with his vision. Today he presents  for scheduled Endoscopic Transphenoidal Removal Pituitary Tumor.  Post-op pt. c/o mild bleeding from left nare . The bleeding was reinforced with nasopore and the bleeding was controlled.

## 2020-10-30 NOTE — PROGRESS NOTE ADULT - SUBJECTIVE AND OBJECTIVE BOX
INTERVAL HISTORY: HPI:  50 y/o male with h/o HTN, T2DN, Hyperlipidemia c/o pituitary macroadenoma found upon workup for a low testosterone level, denies headaches or difficulties with his vision. Today he presents to [facility] for scheduled Endoscopic Transphenoidal Removal Pituitary Tumor on 10/29/20.       PAST MEDICAL & SURGICAL HISTORY:  Pituitary macroadenoma    History of high cholesterol    HTN (hypertension)    T2DM (type 2 diabetes mellitus)    No significant past surgical history        REVIEW OF SYSTEMS: [ ] Unable to Assess due to neurologic exam   [ x] All ROS addressed below are non-contributory, except:  Neuro: [ ] Headache [ ] Back pain [ ] Numbness [ ] Weakness [ ] Ataxia [ ] Dizziness [ ] Aphasia [ ] Dysarthria [ ] Visual disturbance  Resp: [ ] Shortness of breath/dyspnea, [ ] Orthopnea [ ] Cough  CV: [ ] Chest pain [ ] Palpitation [ ] Lightheadedness [ ] Syncope  Renal: [ ] Thirst [ ] Edema  GI: [ ] Nausea [ ] Emesis [ ] Abdominal pain [ ] Constipation [ ] Diarrhea  Hem: [ ] Hematemesis [ ] bright red blood per rectum  ID: [ ] Fever [ ] Chills [ ] Dysuria  ENT: [ ] Rhinorrhea      PHYSICAL EXAM:    General: No Acute Distress   Neurological: Awake, alert oriented to person, place and time, Following Commands, PERRL, EOMI, Face Symmetrical, Speech Fluent, Moving all extremities, Muscle Strength normal in all four extremities, No Drift, Sensation to Light Touch Intact  Pulmonary: Clear to Auscultation, No Rales, No Rhonchi, No Wheezes   Cardiovascular: S1, S2, Regular Rate and Rhythm   Gastrointestinal: Soft, Nontender, Nondistended   Extremities: No calf tenderness       ICU Vital Signs Last 24 Hrs  T(C): 36.6 (30 Oct 2020 03:00), Max: 36.7 (29 Oct 2020 11:55)  T(F): 97.8 (30 Oct 2020 03:00), Max: 98 (29 Oct 2020 11:55)  HR: 94 (30 Oct 2020 06:00) (77 - 100)  BP: --  BP(mean): --  ABP: 143/75 (30 Oct 2020 06:00) (117/72 - 171/86)  ABP(mean): 98 (30 Oct 2020 06:00) (90 - 126)  RR: 13 (30 Oct 2020 06:00) (12 - 23)  SpO2: 95% (30 Oct 2020 06:00) (93% - 96%)      10-29-20 @ 07:01  -  10-30-20 @ 07:00  --------------------------------------------------------  IN: 2690 mL / OUT: 2600 mL / NET: 90 mL        atorvastatin 10 milliGRAM(s) Oral at bedtime  bisacodyl 5 milliGRAM(s) Oral at bedtime  ceFAZolin   IVPB 2000 milliGRAM(s) IV Intermittent once  chlorhexidine 4% Liquid 1 Application(s) Topical <User Schedule>  dextrose 40% Gel 15 Gram(s) Oral once PRN  dextrose 5%. 1000 milliLiter(s) (50 mL/Hr) IV Continuous <Continuous>  dextrose 50% Injectable 12.5 Gram(s) IV Push once  dextrose 50% Injectable 25 Gram(s) IV Push once  dextrose 50% Injectable 25 Gram(s) IV Push once  glucagon  Injectable 1 milliGRAM(s) IntraMuscular once PRN  guaiFENesin   Syrup  (Sugar-Free) 100 milliGRAM(s) Oral every 6 hours PRN  influenza   Vaccine 0.5 milliLiter(s) IntraMuscular once  insulin lispro (ADMELOG) corrective regimen sliding scale   SubCutaneous three times a day before meals  insulin lispro (ADMELOG) corrective regimen sliding scale   SubCutaneous at bedtime  losartan 50 milliGRAM(s) Oral daily  metoprolol succinate ER 50 milliGRAM(s) Oral daily  ondansetron Injectable 4 milliGRAM(s) IV Push every 6 hours PRN  senna 2 Tablet(s) Oral at bedtime  sodium chloride 0.65% Nasal 2 Spray(s) Both Nostrils four times a day  traMADol 50 milliGRAM(s) Oral every 4 hours PRN                            15.4   14.08 )-----------( 234      ( 29 Oct 2020 20:22 )             46.0     10-29    137  |  102  |  17  ----------------------------<  157<H>  4.1   |  19<L>  |  0.88    Ca    8.7      29 Oct 2020 20:22  Phos  4.1     10-29  Mg     2.3     10-29      RADIOLOGY & ADDITIONAL STUDIES:  mr< from: MR Head w/wo IV Cont (10.23.20 @ 10:52) >  FINDINGS:  There is a sellar and suprasellar mass measuring  1.8 cm cc x 1.7 cm transverse x 1.2 cm AP. There is extension into the left cavernous sinus. The infundibulum is noted posteriorly along the mass and is deviated  towards the right. There is mass effect upon the optic chiasm and optic nerves. The flow voids of the carotid arteries are normal.    No hydrocephalus, midline shift or extra-axial collections are identified. No abnormal enhancement within the remainder of the brain is identified. A few scattered nonspecific foci of elevated signal intensity noted within the white matter on T2-weighted images.    A left maxillary sinus polyp versus retention cyst is present. A trace bilateral mastoid effusions are present.    Impression:    Sellar and suprasellar mass likely macroadenoma. Mass effect upon the optic chiasm optic nerves and extension into the left cavernous sinus.    < end of copied text >

## 2020-10-30 NOTE — OCCUPATIONAL THERAPY INITIAL EVALUATION ADULT - PERTINENT HX OF CURRENT PROBLEM, REHAB EVAL
50 y/o male with h/o HTN, T2DN, Hyperlipidemia c/o pituitary macroadenoma found upon workup for a low testosterone level, denies headaches or difficulties with his vision. scheduled Endoscopic Transphenoidal Removal Pituitary Tumor on 10/29/20.

## 2020-10-30 NOTE — PHYSICAL THERAPY INITIAL EVALUATION ADULT - WEIGHT-BEARING RESTRICTIONS, REHAB EVAL
"Subjective   James Fabian is a 57 y.o. male.  One month follow-up for anxiety.  Was weaned ff his Celexa and began Effexor at previous visit.  \"I feel like I'm alive.\"  Reports he has done much better on the Effexor but still occasionally may have feelings of panic.  Has not been using the BuSpar very often as he said has not been needed.  \"This has really made a difference in my life.\"  Has lost almost 10 pounds since his last visit after beginning the keto diet.    Anxiety   Presents for follow-up visit. Symptoms include nervous/anxious behavior. Patient reports no confusion or shortness of breath. Symptoms occur occasionally. The severity of symptoms is moderate. The quality of sleep is good. Nighttime awakenings: none.     Compliance with medications is %.        The following portions of the patient's history were reviewed and updated as appropriate: allergies, current medications, past family history, past medical history, past social history, past surgical history and problem list.    Review of Systems   Constitutional: Negative.  Negative for chills, diaphoresis, fatigue and fever.   HENT: Negative.    Eyes: Negative.    Respiratory: Negative.  Negative for shortness of breath.    Cardiovascular: Negative.    Gastrointestinal: Negative.    Genitourinary: Negative.    Musculoskeletal: Negative.    Skin: Negative.    Neurological: Negative.    Psychiatric/Behavioral: Negative for confusion. The patient is nervous/anxious.        Objective   Physical Exam   Constitutional: He is oriented to person, place, and time. He appears well-developed and well-nourished.   HENT:   Head: Normocephalic.   Right Ear: External ear normal.   Left Ear: External ear normal.   Eyes: EOM are normal. Pupils are equal, round, and reactive to light.   Neck: Normal range of motion. Neck supple.   Cardiovascular: Normal rate, regular rhythm and normal heart sounds.    Pulmonary/Chest: Effort normal and breath sounds " normal.   Abdominal: Soft. Bowel sounds are normal.   Musculoskeletal: Normal range of motion.   Neurological: He is alert and oriented to person, place, and time.   Skin: Skin is warm. Capillary refill takes less than 2 seconds.   Psychiatric: He has a normal mood and affect. His behavior is normal.   Nursing note and vitals reviewed.      Assessment/Plan   Problems Addressed this Visit     None      Visit Diagnoses     Anxiety        Relevant Medications    venlafaxine (EFFEXOR) 75 MG tablet        1.  Anxiety:  Will increase Effexor from 50 mg by mouth twice a day to 75 mg by mouth twice a day  Educated on possible side of this medication including but not limited possible suicidal ideations  Encouraged to discontinue medication immediately if suicidal ideations occur and seek emergency medical treatment  Continue on current medications as previously prescribed  Schedule follow-up appointment with this office in 3 months for recheck        This document has been electronically signed by ISABEL Reynolds on July 17, 2018 1:42 PM           full weight-bearing

## 2020-10-30 NOTE — PHYSICAL THERAPY INITIAL EVALUATION ADULT - GENERAL OBSERVATIONS, REHAB EVAL
Pt received supine in bed, A&Ox4, +BP cuff, +tele, +cont pulse ox, agreeable to physical therapy [place] 45 min.

## 2020-10-30 NOTE — PHYSICAL THERAPY INITIAL EVALUATION ADULT - HEALTH SCREEN CRITERIA
yes
[FreeTextEntry1] : 12 year old with sore throat, nausea, malaise X 2 days.\par Rapid strep: negative\par Throat culture sent.  Will call mom if positive.\par Acute pharyngitis, likely viral.  Throat culture was sent to rule out strep.  Results usually take 3 days for final results.  For now, supportive care. \par May give acetaminophen  or ibuprofen for pain or fever.  Provide adequate fluids and rest.  Sipping cold or warm beverages, tea with honey, eating cold or frozen desserts (ice pops), sucking on hard candy or lozenges, gargling with warm salt water may help ease sore throat pain.\par

## 2020-10-30 NOTE — OCCUPATIONAL THERAPY INITIAL EVALUATION ADULT - ADDITIONAL COMMENTS
Pt reports that he lives with wife and children in a private house. One small step to enter. 1 FOS to bedroom with tub in bathroom. Was independent in all ad's, and work in IT pta.

## 2020-10-31 LAB
ANION GAP SERPL CALC-SCNC: 16 MMOL/L — SIGNIFICANT CHANGE UP (ref 5–17)
ANION GAP SERPL CALC-SCNC: 17 MMOL/L — SIGNIFICANT CHANGE UP (ref 5–17)
BUN SERPL-MCNC: 21 MG/DL — SIGNIFICANT CHANGE UP (ref 7–23)
BUN SERPL-MCNC: 21 MG/DL — SIGNIFICANT CHANGE UP (ref 7–23)
CALCIUM SERPL-MCNC: 8.7 MG/DL — SIGNIFICANT CHANGE UP (ref 8.4–10.5)
CALCIUM SERPL-MCNC: 9.1 MG/DL — SIGNIFICANT CHANGE UP (ref 8.4–10.5)
CHLORIDE SERPL-SCNC: 93 MMOL/L — LOW (ref 96–108)
CHLORIDE SERPL-SCNC: 97 MMOL/L — SIGNIFICANT CHANGE UP (ref 96–108)
CO2 SERPL-SCNC: 21 MMOL/L — LOW (ref 22–31)
CO2 SERPL-SCNC: 23 MMOL/L — SIGNIFICANT CHANGE UP (ref 22–31)
CORTIS AM PEAK SERPL-MCNC: 4.1 UG/DL — LOW (ref 6–18.4)
CREAT SERPL-MCNC: 0.75 MG/DL — SIGNIFICANT CHANGE UP (ref 0.5–1.3)
CREAT SERPL-MCNC: 0.76 MG/DL — SIGNIFICANT CHANGE UP (ref 0.5–1.3)
GLUCOSE BLDC GLUCOMTR-MCNC: 109 MG/DL — HIGH (ref 70–99)
GLUCOSE BLDC GLUCOMTR-MCNC: 110 MG/DL — HIGH (ref 70–99)
GLUCOSE BLDC GLUCOMTR-MCNC: 112 MG/DL — HIGH (ref 70–99)
GLUCOSE BLDC GLUCOMTR-MCNC: 117 MG/DL — HIGH (ref 70–99)
GLUCOSE SERPL-MCNC: 115 MG/DL — HIGH (ref 70–99)
GLUCOSE SERPL-MCNC: 120 MG/DL — HIGH (ref 70–99)
HCT VFR BLD CALC: 47.1 % — SIGNIFICANT CHANGE UP (ref 39–50)
HGB BLD-MCNC: 15.6 G/DL — SIGNIFICANT CHANGE UP (ref 13–17)
MAGNESIUM SERPL-MCNC: 2.4 MG/DL — SIGNIFICANT CHANGE UP (ref 1.6–2.6)
MCHC RBC-ENTMCNC: 29.9 PG — SIGNIFICANT CHANGE UP (ref 27–34)
MCHC RBC-ENTMCNC: 33.1 GM/DL — SIGNIFICANT CHANGE UP (ref 32–36)
MCV RBC AUTO: 90.2 FL — SIGNIFICANT CHANGE UP (ref 80–100)
NRBC # BLD: 0 /100 WBCS — SIGNIFICANT CHANGE UP (ref 0–0)
PHOSPHATE SERPL-MCNC: 1.7 MG/DL — LOW (ref 2.5–4.5)
PLATELET # BLD AUTO: 283 K/UL — SIGNIFICANT CHANGE UP (ref 150–400)
POTASSIUM SERPL-MCNC: 3.8 MMOL/L — SIGNIFICANT CHANGE UP (ref 3.5–5.3)
POTASSIUM SERPL-MCNC: 4.2 MMOL/L — SIGNIFICANT CHANGE UP (ref 3.5–5.3)
POTASSIUM SERPL-SCNC: 3.8 MMOL/L — SIGNIFICANT CHANGE UP (ref 3.5–5.3)
POTASSIUM SERPL-SCNC: 4.2 MMOL/L — SIGNIFICANT CHANGE UP (ref 3.5–5.3)
RBC # BLD: 5.22 M/UL — SIGNIFICANT CHANGE UP (ref 4.2–5.8)
RBC # FLD: 14.7 % — HIGH (ref 10.3–14.5)
SODIUM SERPL-SCNC: 132 MMOL/L — LOW (ref 135–145)
SODIUM SERPL-SCNC: 135 MMOL/L — SIGNIFICANT CHANGE UP (ref 135–145)
WBC # BLD: 15.69 K/UL — HIGH (ref 3.8–10.5)
WBC # FLD AUTO: 15.69 K/UL — HIGH (ref 3.8–10.5)

## 2020-10-31 RX ORDER — ACETAMINOPHEN 500 MG
975 TABLET ORAL ONCE
Refills: 0 | Status: COMPLETED | OUTPATIENT
Start: 2020-10-31 | End: 2020-10-31

## 2020-10-31 RX ORDER — OXYCODONE HYDROCHLORIDE 5 MG/1
10 TABLET ORAL EVERY 4 HOURS
Refills: 0 | Status: DISCONTINUED | OUTPATIENT
Start: 2020-10-31 | End: 2020-11-05

## 2020-10-31 RX ORDER — SODIUM,POTASSIUM PHOSPHATES 278-250MG
1 POWDER IN PACKET (EA) ORAL
Refills: 0 | Status: COMPLETED | OUTPATIENT
Start: 2020-10-31 | End: 2020-11-01

## 2020-10-31 RX ORDER — OXYCODONE HYDROCHLORIDE 5 MG/1
5 TABLET ORAL EVERY 4 HOURS
Refills: 0 | Status: DISCONTINUED | OUTPATIENT
Start: 2020-10-31 | End: 2020-11-05

## 2020-10-31 RX ADMIN — OXYCODONE HYDROCHLORIDE 5 MILLIGRAM(S): 5 TABLET ORAL at 23:01

## 2020-10-31 RX ADMIN — OXYCODONE HYDROCHLORIDE 5 MILLIGRAM(S): 5 TABLET ORAL at 18:09

## 2020-10-31 RX ADMIN — OXYCODONE HYDROCHLORIDE 10 MILLIGRAM(S): 5 TABLET ORAL at 20:56

## 2020-10-31 RX ADMIN — OXYCODONE HYDROCHLORIDE 5 MILLIGRAM(S): 5 TABLET ORAL at 11:59

## 2020-10-31 RX ADMIN — Medication 975 MILLIGRAM(S): at 13:50

## 2020-10-31 RX ADMIN — Medication 2 SPRAY(S): at 17:25

## 2020-10-31 RX ADMIN — TRAMADOL HYDROCHLORIDE 50 MILLIGRAM(S): 50 TABLET ORAL at 01:50

## 2020-10-31 RX ADMIN — TRAMADOL HYDROCHLORIDE 50 MILLIGRAM(S): 50 TABLET ORAL at 09:55

## 2020-10-31 RX ADMIN — ENOXAPARIN SODIUM 40 MILLIGRAM(S): 100 INJECTION SUBCUTANEOUS at 17:25

## 2020-10-31 RX ADMIN — Medication 50 MILLIGRAM(S): at 05:13

## 2020-10-31 RX ADMIN — OXYCODONE HYDROCHLORIDE 5 MILLIGRAM(S): 5 TABLET ORAL at 11:29

## 2020-10-31 RX ADMIN — Medication 2 SPRAY(S): at 05:13

## 2020-10-31 RX ADMIN — TRAMADOL HYDROCHLORIDE 50 MILLIGRAM(S): 50 TABLET ORAL at 02:28

## 2020-10-31 RX ADMIN — ONDANSETRON 4 MILLIGRAM(S): 8 TABLET, FILM COATED ORAL at 01:50

## 2020-10-31 RX ADMIN — Medication 1 PACKET(S): at 17:25

## 2020-10-31 RX ADMIN — ATORVASTATIN CALCIUM 10 MILLIGRAM(S): 80 TABLET, FILM COATED ORAL at 23:02

## 2020-10-31 RX ADMIN — TRAMADOL HYDROCHLORIDE 50 MILLIGRAM(S): 50 TABLET ORAL at 09:25

## 2020-10-31 RX ADMIN — Medication 1 PACKET(S): at 11:29

## 2020-10-31 RX ADMIN — OXYCODONE HYDROCHLORIDE 10 MILLIGRAM(S): 5 TABLET ORAL at 20:18

## 2020-10-31 RX ADMIN — Medication 975 MILLIGRAM(S): at 13:20

## 2020-10-31 RX ADMIN — LOSARTAN POTASSIUM 50 MILLIGRAM(S): 100 TABLET, FILM COATED ORAL at 05:13

## 2020-10-31 RX ADMIN — Medication 2 SPRAY(S): at 11:31

## 2020-10-31 RX ADMIN — SENNA PLUS 2 TABLET(S): 8.6 TABLET ORAL at 23:02

## 2020-10-31 RX ADMIN — OXYCODONE HYDROCHLORIDE 5 MILLIGRAM(S): 5 TABLET ORAL at 23:30

## 2020-10-31 RX ADMIN — OXYCODONE HYDROCHLORIDE 5 MILLIGRAM(S): 5 TABLET ORAL at 17:39

## 2020-10-31 NOTE — CONSULT NOTE ADULT - SUBJECTIVE AND OBJECTIVE BOX
Patient is a 49y old  Male who presents with a chief complaint of pituitary mass (30 Oct 2020 07:26)      HPI:  50 y/o male with h/o HTN, T2DN, Hyperlipidemia c/o pituitary macroadenoma found upon workup for a low testosterone level, denies headaches or difficulties with his vision. Today he presents to [facility] for scheduled Endoscopic Transphenoidal Removal Pituitary Tumor on 10/29/20.   ***COVID swab scheduled for 10/26/20***   (22 Oct 2020 08:35)      PAST MEDICAL & SURGICAL HISTORY:  Pituitary macroadenoma    History of high cholesterol    HTN (hypertension)    T2DM (type 2 diabetes mellitus)    No significant past surgical history        FAMILY HISTORY:      SOCIAL HISTORY:    Allergies    No Known Allergies    Intolerances        Review of Systems:    General:	Denies fatigue, fevers, chills, sweats, + decreased appetite.    Skin/Breast: denies pruritis, rash  	  Ophthalmologic: Denies change in vision or blurring  	  HEENT	Denies dry mouth, oral sores, dysphagia,  change in hearing.    Respiratory and Thorax:  Denies cough, sob, wheeze, hemoptysis  	  Cardiovascular:	Denies cp , palp, orthopnea    Gastrointestinal:	Denies n/v/d constipation    Genitourinary:	Denies dysuria of frequency, hematuria, flank pain    Musculoskeletal:	Denies bone or joint pain, muscle aches.     Neurological:	 Denies change in sensory or motor function, + headache, + dizziness.     Psychiatric:	Denies depression, anxiety, insomnia.     Hematology/Lymphatics: +bleeding or bruising  	    SUBJECTIVE / OVERNIGHT EVENTS:    pt co some dizziness and HA. denies nausea. had nausea yesterday but improved today. trying to eat breakfast.    Vital Signs Last 24 Hrs  T(C): 36.3 (31 Oct 2020 08:17), Max: 36.7 (31 Oct 2020 04:39)  T(F): 97.3 (31 Oct 2020 08:17), Max: 98.1 (31 Oct 2020 04:39)  HR: 81 (31 Oct 2020 08:17) (66 - 91)  BP: 152/84 (31 Oct 2020 08:17) (130/70 - 158/97)  BP(mean): 111 (30 Oct 2020 19:00) (104 - 115)  RR: 18 (31 Oct 2020 08:17) (11 - 18)  SpO2: 95% (31 Oct 2020 08:17) (93% - 99%)  I&O's Summary    30 Oct 2020 07:01  -  31 Oct 2020 07:00  --------------------------------------------------------  IN: 945 mL / OUT: 1061 mL / NET: -116 mL    31 Oct 2020 08:01  -  31 Oct 2020 09:57  --------------------------------------------------------  IN: 360 mL / OUT: 0 mL / NET: 360 mL        PHYSICAL EXAM:  GENERAL: NAD, Comfortable, nasal packing and dressing  HEAD:  Atraumatic, Normocephalic  CHEST/LUNG: Clear to auscultation bilaterally; No wheeze  HEART: Regular rate and rhythm; No murmurs, rubs, or gallops  ABDOMEN: Soft, Nontender, Nondistended; Bowel sounds present  Neuro: AAOx3, no focal deficit, 5/5 b/l extremities  EXTREMITIES:  2+ Peripheral Pulses, No clubbing, cyanosis, or edema  SKIN: No rashes or lesions    LABS:                        15.6   15.69 )-----------( 283      ( 31 Oct 2020 06:18 )             47.1     10-31    135  |  97  |  21  ----------------------------<  120<H>  3.8   |  21<L>  |  0.76    Ca    8.7      31 Oct 2020 06:18  Phos  1.7     10-31  Mg     2.4     10-31        CAPILLARY BLOOD GLUCOSE      POCT Blood Glucose.: 110 mg/dL (31 Oct 2020 08:47)  POCT Blood Glucose.: 126 mg/dL (30 Oct 2020 21:01)  POCT Blood Glucose.: 126 mg/dL (30 Oct 2020 17:15)  POCT Blood Glucose.: 129 mg/dL (30 Oct 2020 14:12)            RADIOLOGY & ADDITIONAL TESTS:    Imaging Personally Reviewed:  [x] YES  [ ] NO    Consultant(s) Notes Reviewed:  [x] YES  [ ] NO      MEDICATIONS  (STANDING):  atorvastatin 10 milliGRAM(s) Oral at bedtime  bisacodyl 5 milliGRAM(s) Oral at bedtime  dextrose 5%. 1000 milliLiter(s) (50 mL/Hr) IV Continuous <Continuous>  dextrose 50% Injectable 12.5 Gram(s) IV Push once  dextrose 50% Injectable 25 Gram(s) IV Push once  dextrose 50% Injectable 25 Gram(s) IV Push once  enoxaparin Injectable 40 milliGRAM(s) SubCutaneous <User Schedule>  influenza   Vaccine 0.5 milliLiter(s) IntraMuscular once  insulin lispro (ADMELOG) corrective regimen sliding scale   SubCutaneous three times a day before meals  insulin lispro (ADMELOG) corrective regimen sliding scale   SubCutaneous at bedtime  losartan 50 milliGRAM(s) Oral daily  metoprolol succinate ER 50 milliGRAM(s) Oral daily  potassium phosphate / sodium phosphate Powder (PHOS-NaK) 1 Packet(s) Oral four times a day  senna 2 Tablet(s) Oral at bedtime  sodium chloride 0.65% Nasal 2 Spray(s) Both Nostrils four times a day    MEDICATIONS  (PRN):  dextrose 40% Gel 15 Gram(s) Oral once PRN Blood Glucose LESS THAN 70 milliGRAM(s)/deciliter  glucagon  Injectable 1 milliGRAM(s) IntraMuscular once PRN Glucose LESS THAN 70 milligrams/deciliter  guaiFENesin   Syrup  (Sugar-Free) 100 milliGRAM(s) Oral every 6 hours PRN Cough  ondansetron Injectable 4 milliGRAM(s) IV Push every 6 hours PRN Nausea and/or Vomiting  traMADol 50 milliGRAM(s) Oral every 4 hours PRN Moderate Pain (4 - 6)      Care Discussed with Consultants/Other Providers [x] YES  [ ] NO    HEALTH ISSUES - PROBLEM Dx:  Need for prophylactic measure    T2DM (type 2 diabetes mellitus)    HTN (hypertension)    Pituitary macroadenoma

## 2020-10-31 NOTE — PROGRESS NOTE ADULT - ASSESSMENT
48 y/o male with h/o HTN, T2DN, Hyperlipidemia found to have pituitary macroadenoma following outpatient workup for low Testosterone now s/p endoscopic TSP resection c/b CSF leak repair w/ nasal septal flap+ duragen closure on 10/29/2020.      NEURO:  - neuro check q4  - post op MRI on 10/30 revealed the residual soft tissue in the sella and suprasellar region appears of similar size with only minimal peripheral enhancement consistent with postoperative resection without interval loss of height. There is no change in mild mass effect on the optic chiasm. No acute infarcts. Normal intracranial enhancemen  - pain management with tylenol and oxycodone prn  - TSP/skull base precautions: no straws, nasal instrumentation BiPAP, no incentive spirometer  - DI watch   - watch for CSF leak   - PT/OT evaluation    PULMONARY:  - saturating well on RA   - pituitary precaution     CARDIOVASCULAR:  - HTN, c/w metoprolol 50mg qd, losartan 50mg qd  - HLD, c/w Atorvastatin 10mg    GASTROENTEROLOGY:  - tolerating regular diet   - senna and miralax for bowel regimens    ENDOCRINE:  - DI watch: currently no evidence of DI; if UO> 300 ml in 2 consecutive hours , will send BMP, urine SG   - patient was given dexamethasone intra-OP, AM cortisol on 10/30 27.4, today am cortisol was 4.1, patient currently hemodynamically stable, will f/u am cortisol tomorrow, endocrine consult appreciated, will f/u recommendations  - T2DM, Aa1c 7%, continue HISS     RENAL:  - strict I&O for DI watch  - BMP Q12hrs      INFECTIOUS:  - afebrile    HEME/ONC:  - DVT ppx: b/l SCDs and SQL    Disposition: No PT needs, will d/c home once medically cleared    will discuss above with Dr. Gabe palenciaink [id number]

## 2020-10-31 NOTE — PROGRESS NOTE ADULT - SUBJECTIVE AND OBJECTIVE BOX
Patient was seen at bedside this am. Patient was feeling well. Denied any headache, salty/metalic taste when swallowing, blurry vision, cp, sob, n/v, or abd pain.    OVERNIGHT EVENTS: No acute event overnight    Vital Signs Last 24 Hrs  T(C): 36.3 (31 Oct 2020 12:28), Max: 36.7 (31 Oct 2020 04:39)  T(F): 97.3 (31 Oct 2020 12:28), Max: 98.1 (31 Oct 2020 04:39)  HR: 77 (31 Oct 2020 12:28) (66 - 87)  BP: 149/95 (31 Oct 2020 12:28) (130/70 - 158/97)  BP(mean): 111 (30 Oct 2020 19:00) (110 - 115)  RR: 17 (31 Oct 2020 12:28) (11 - 18)  SpO2: 95% (31 Oct 2020 12:28) (93% - 99%)    I&O's Detail    30 Oct 2020 07:01  -  31 Oct 2020 07:00  --------------------------------------------------------  IN:    Oral Fluid: 945 mL  Total IN: 945 mL    OUT:    Indwelling Catheter - Urethral (mL): 330 mL    Voided (mL): 731 mL  Total OUT: 1061 mL    Total NET: -116 mL      31 Oct 2020 08:01  -  31 Oct 2020 14:16  --------------------------------------------------------  IN:    Oral Fluid: 360 mL  Total IN: 360 mL    OUT:  Total OUT: 0 mL    Total NET: 360 mL        I&O's Summary    30 Oct 2020 07:01  -  31 Oct 2020 07:00  --------------------------------------------------------  IN: 945 mL / OUT: 1061 mL / NET: -116 mL    31 Oct 2020 08:01  -  31 Oct 2020 14:16  --------------------------------------------------------  IN: 360 mL / OUT: 0 mL / NET: 360 mL        PHYSICAL EXAM:  Neurological:  awake, alert, oriented to person, place, and date, PERRL, EOM intact, no visual field deficit on confrontation test, speech clear and fluent, no facial asymmetry noted, following commands, no drift, moving all extremities 5/5, sensation intact to light touch  Cardiovascular: +s1, s2  Respiratory: clear to auscultation b/l  Gastrointestinal: soft, non-distended, non-tender  Extremities: warm, dry, no calf tendernss  Incision/Wound: no evidence of CSF leak      LABS:                        15.6   15.69 )-----------( 283      ( 31 Oct 2020 06:18 )             47.1     10-31    135  |  97  |  21  ----------------------------<  120<H>  3.8   |  21<L>  |  0.76    Ca    8.7      31 Oct 2020 06:18  Phos  1.7     10-31  Mg     2.4     10-31              CAPILLARY BLOOD GLUCOSE      POCT Blood Glucose.: 117 mg/dL (31 Oct 2020 12:45)  POCT Blood Glucose.: 110 mg/dL (31 Oct 2020 08:47)  POCT Blood Glucose.: 126 mg/dL (30 Oct 2020 21:01)  POCT Blood Glucose.: 126 mg/dL (30 Oct 2020 17:15)      Drug Levels: [] N/A    CSF Analysis: [] N/A      Allergies    No Known Allergies    Intolerances      MEDICATIONS:  Antibiotics:    Neuro:  ondansetron Injectable 4 milliGRAM(s) IV Push every 6 hours PRN  oxyCODONE    IR 5 milliGRAM(s) Oral every 4 hours PRN  oxyCODONE    IR 10 milliGRAM(s) Oral every 4 hours PRN    Anticoagulation:  enoxaparin Injectable 40 milliGRAM(s) SubCutaneous <User Schedule>    OTHER:  atorvastatin 10 milliGRAM(s) Oral at bedtime  bisacodyl 5 milliGRAM(s) Oral at bedtime  dextrose 40% Gel 15 Gram(s) Oral once PRN  dextrose 50% Injectable 12.5 Gram(s) IV Push once  dextrose 50% Injectable 25 Gram(s) IV Push once  dextrose 50% Injectable 25 Gram(s) IV Push once  glucagon  Injectable 1 milliGRAM(s) IntraMuscular once PRN  guaiFENesin   Syrup  (Sugar-Free) 100 milliGRAM(s) Oral every 6 hours PRN  influenza   Vaccine 0.5 milliLiter(s) IntraMuscular once  insulin lispro (ADMELOG) corrective regimen sliding scale   SubCutaneous three times a day before meals  insulin lispro (ADMELOG) corrective regimen sliding scale   SubCutaneous at bedtime  losartan 50 milliGRAM(s) Oral daily  metoprolol succinate ER 50 milliGRAM(s) Oral daily  senna 2 Tablet(s) Oral at bedtime  sodium chloride 0.65% Nasal 2 Spray(s) Both Nostrils four times a day    IVF:  dextrose 5%. 1000 milliLiter(s) IV Continuous <Continuous>  potassium phosphate / sodium phosphate Powder (PHOS-NaK) 1 Packet(s) Oral four times a day    CULTURES:    RADIOLOGY & ADDITIONAL TESTS:

## 2020-10-31 NOTE — PROGRESS NOTE ADULT - THIS PATIENT HAS THE FOLLOWING CONDITION(S)/DIAGNOSES ON THIS ADMISSION:
Writer spoke with patient regarding the [facility] services that patient was receiving prior to hospitalization.  Home care experience discussed and patient states that she is satisfied with the care that she received and is interested in resuming services if needed upon discharge from hospital.    None

## 2020-10-31 NOTE — CONSULT NOTE ADULT - ASSESSMENT
50 y/o male with h/o HTN, T2DN, Hyperlipidemia c/o pituitary macroadenoma, sp endoscopic endonasal transsphenoidal resection pituitary mass and repair small CSF leak. medicine consult for co management    # HTN  # DM2  # HLD  # pituitary macroadenoma sp endoscopic endonasal transphenoidal resection and CSF leak repair POD # 1  # epistaxis    appreciate NSG care  BP acceptable, cont losartan and BB  cont statin  FS acceptable, cont SSI and corrective SI  pain control and bowel regimen  neuro checks  am cortisol, monitor DI    PCP Dr. Arndt    Thank you for this consult. Please call [employer] with questions [phone].

## 2020-10-31 NOTE — PROVIDER CONTACT NOTE (OTHER) - SITUATION
pt has small amount of serosanguinous drainage. no "halo" on bandage and pt states he does not have a salty taste

## 2020-10-31 NOTE — PROGRESS NOTE ADULT - ASSESSMENT
50 y/o male with h/o HTN, T2DN, Hyperlipidemia c/o pituitary macroadenoma found upon workup for a low testosterone level, denies headaches or difficulties with his vision. Today he presents  for scheduled Endoscopic Transphenoidal Removal Pituitary Tumor.  Post-op pt . The bleeding was reinforced with nasopore and the bleeding was controlled.

## 2020-10-31 NOTE — PROGRESS NOTE ADULT - SUBJECTIVE AND OBJECTIVE BOX
ENT ISSUE/POD: TSRP with left nasoseptal flap for small CSF leak POD#2      HPI: 50 y/o male with h/o HTN, T2DN, Hyperlipidemia c/o pituitary macroadenoma found upon workup for a low testosterone level, denies headaches or difficulties with his vision. Today he presents  for scheduled TSRP with left nasoseptal flap for small CSF leak  POD#2. Pt. was seen post op for small amount of epistaxis from left nare. Pt. was packed in the OR with nasopore but continued to bleed from left nare intermittently controlled with a small amount of nasopore. Pt. denies salty taste in mouth , headaches, N/V.            PAST MEDICAL & SURGICAL HISTORY:  Pituitary macroadenoma    History of high cholesterol    HTN (hypertension)    T2DM (type 2 diabetes mellitus)    No significant past surgical history      Allergies    No Known Allergies    Intolerances      MEDICATIONS  (STANDING):  atorvastatin 10 milliGRAM(s) Oral at bedtime  bisacodyl 5 milliGRAM(s) Oral at bedtime  dextrose 5%. 1000 milliLiter(s) (50 mL/Hr) IV Continuous <Continuous>  dextrose 50% Injectable 12.5 Gram(s) IV Push once  dextrose 50% Injectable 25 Gram(s) IV Push once  dextrose 50% Injectable 25 Gram(s) IV Push once  enoxaparin Injectable 40 milliGRAM(s) SubCutaneous <User Schedule>  influenza   Vaccine 0.5 milliLiter(s) IntraMuscular once  insulin lispro (ADMELOG) corrective regimen sliding scale   SubCutaneous three times a day before meals  insulin lispro (ADMELOG) corrective regimen sliding scale   SubCutaneous at bedtime  losartan 50 milliGRAM(s) Oral daily  metoprolol succinate ER 50 milliGRAM(s) Oral daily  potassium phosphate / sodium phosphate Powder (PHOS-NaK) 1 Packet(s) Oral four times a day  senna 2 Tablet(s) Oral at bedtime  sodium chloride 0.65% Nasal 2 Spray(s) Both Nostrils four times a day    MEDICATIONS  (PRN):  dextrose 40% Gel 15 Gram(s) Oral once PRN Blood Glucose LESS THAN 70 milliGRAM(s)/deciliter  glucagon  Injectable 1 milliGRAM(s) IntraMuscular once PRN Glucose LESS THAN 70 milligrams/deciliter  guaiFENesin   Syrup  (Sugar-Free) 100 milliGRAM(s) Oral every 6 hours PRN Cough  ondansetron Injectable 4 milliGRAM(s) IV Push every 6 hours PRN Nausea and/or Vomiting  oxyCODONE    IR 5 milliGRAM(s) Oral every 4 hours PRN Moderate Pain (4 - 6)  oxyCODONE    IR 10 milliGRAM(s) Oral every 4 hours PRN Severe Pain (7 - 10)      Social History: see consult    Family history: see consult    ROS:   ENT: all negative except as noted in HPI   Pulm: denies SOB, cough, hemoptysis  Neuro: denies numbness/tingling, loss of sensation  Endo: denies heat/cold intolerance, excessive sweating      Vital Signs Last 24 Hrs  T(C): 36.3 (31 Oct 2020 15:28), Max: 36.7 (31 Oct 2020 04:39)  T(F): 97.3 (31 Oct 2020 15:28), Max: 98.1 (31 Oct 2020 04:39)  HR: 69 (31 Oct 2020 15:28) (69 - 87)  BP: 166/98 (31 Oct 2020 15:28) (130/70 - 166/98)  BP(mean): 111 (30 Oct 2020 19:00) (111 - 115)  RR: 17 (31 Oct 2020 15:28) (11 - 18)  SpO2: 96% (31 Oct 2020 15:28) (93% - 99%)                          15.6   15.69 )-----------( 283      ( 31 Oct 2020 06:18 )             47.1    10-31    135  |  97  |  21  ----------------------------<  120<H>  3.8   |  21<L>  |  0.76    Ca    8.7      31 Oct 2020 06:18  Phos  1.7     10-31  Mg     2.4     10-31         PHYSICAL EXAM:  Gen: NAD  Skin: No rashes, bruises, or lesions  Head: Normocephalic, Atraumatic  Face: no edema, erythema, or fluctuance. Parotid glands soft without mass  Eyes: no scleral injection  Nose: Right: nasopore placed in OR, unable to visualize  packing,, no active bleeding            Left: small amt. of bleeding noted, nasopore was added anteriorly to control bleeding, mustache dressing applied  Mouth: No Stridor / Drooling / Trismus.  Mucosa moist, tongue/uvula midline, oropharynx clear with no blood in oropharynx  Neck: Flat, supple, no lymphadenopathy, trachea midline, no masses  Lymphatic: No lymphadenopathy  Resp: breathing easily, no stridor  Neuro: facial nerve intact, no facial droop

## 2020-11-01 DIAGNOSIS — I10 ESSENTIAL (PRIMARY) HYPERTENSION: ICD-10-CM

## 2020-11-01 DIAGNOSIS — E23.0 HYPOPITUITARISM: ICD-10-CM

## 2020-11-01 DIAGNOSIS — E11.9 TYPE 2 DIABETES MELLITUS WITHOUT COMPLICATIONS: ICD-10-CM

## 2020-11-01 DIAGNOSIS — E27.49 OTHER ADRENOCORTICAL INSUFFICIENCY: ICD-10-CM

## 2020-11-01 DIAGNOSIS — E03.8 OTHER SPECIFIED HYPOTHYROIDISM: ICD-10-CM

## 2020-11-01 DIAGNOSIS — E78.5 HYPERLIPIDEMIA, UNSPECIFIED: ICD-10-CM

## 2020-11-01 LAB
ANION GAP SERPL CALC-SCNC: 18 MMOL/L — HIGH (ref 5–17)
BUN SERPL-MCNC: 18 MG/DL — SIGNIFICANT CHANGE UP (ref 7–23)
CALCIUM SERPL-MCNC: 8.8 MG/DL — SIGNIFICANT CHANGE UP (ref 8.4–10.5)
CHLORIDE SERPL-SCNC: 91 MMOL/L — LOW (ref 96–108)
CO2 SERPL-SCNC: 22 MMOL/L — SIGNIFICANT CHANGE UP (ref 22–31)
CORTIS AM PEAK SERPL-MCNC: 2.3 UG/DL — LOW (ref 6–18.4)
CREAT SERPL-MCNC: 0.62 MG/DL — SIGNIFICANT CHANGE UP (ref 0.5–1.3)
GLUCOSE BLDC GLUCOMTR-MCNC: 102 MG/DL — HIGH (ref 70–99)
GLUCOSE BLDC GLUCOMTR-MCNC: 108 MG/DL — HIGH (ref 70–99)
GLUCOSE BLDC GLUCOMTR-MCNC: 120 MG/DL — HIGH (ref 70–99)
GLUCOSE BLDC GLUCOMTR-MCNC: 121 MG/DL — HIGH (ref 70–99)
GLUCOSE SERPL-MCNC: 85 MG/DL — SIGNIFICANT CHANGE UP (ref 70–99)
MAGNESIUM SERPL-MCNC: 2 MG/DL — SIGNIFICANT CHANGE UP (ref 1.6–2.6)
OSMOLALITY UR: 856 MOS/KG — SIGNIFICANT CHANGE UP (ref 300–900)
PHOSPHATE SERPL-MCNC: 2 MG/DL — LOW (ref 2.5–4.5)
POTASSIUM SERPL-MCNC: 3.7 MMOL/L — SIGNIFICANT CHANGE UP (ref 3.5–5.3)
POTASSIUM SERPL-SCNC: 3.7 MMOL/L — SIGNIFICANT CHANGE UP (ref 3.5–5.3)
SODIUM SERPL-SCNC: 131 MMOL/L — LOW (ref 135–145)
T3 SERPL-MCNC: 47 NG/DL — LOW (ref 80–200)
T4 AB SER-ACNC: 4.2 UG/DL — LOW (ref 4.6–12)
T4 FREE SERPL-MCNC: 0.6 NG/DL — LOW (ref 0.9–1.8)
TSH SERPL-MCNC: 0.48 UIU/ML — SIGNIFICANT CHANGE UP (ref 0.27–4.2)

## 2020-11-01 PROCEDURE — 99223 1ST HOSP IP/OBS HIGH 75: CPT | Mod: GC

## 2020-11-01 RX ORDER — HYDROCORTISONE 20 MG
10 TABLET ORAL
Refills: 0 | Status: DISCONTINUED | OUTPATIENT
Start: 2020-11-02 | End: 2020-11-02

## 2020-11-01 RX ORDER — HYDROCORTISONE 20 MG
5 TABLET ORAL
Refills: 0 | Status: DISCONTINUED | OUTPATIENT
Start: 2020-11-01 | End: 2020-11-02

## 2020-11-01 RX ORDER — SODIUM CHLORIDE 9 MG/ML
1 INJECTION INTRAMUSCULAR; INTRAVENOUS; SUBCUTANEOUS
Refills: 0 | Status: DISCONTINUED | OUTPATIENT
Start: 2020-11-01 | End: 2020-11-01

## 2020-11-01 RX ORDER — LEVOTHYROXINE SODIUM 125 MCG
75 TABLET ORAL DAILY
Refills: 0 | Status: DISCONTINUED | OUTPATIENT
Start: 2020-11-02 | End: 2020-11-02

## 2020-11-01 RX ORDER — SODIUM CHLORIDE 9 MG/ML
2 INJECTION INTRAMUSCULAR; INTRAVENOUS; SUBCUTANEOUS
Refills: 0 | Status: DISCONTINUED | OUTPATIENT
Start: 2020-11-01 | End: 2020-11-03

## 2020-11-01 RX ADMIN — Medication 50 MILLIGRAM(S): at 05:23

## 2020-11-01 RX ADMIN — Medication 5 MILLIGRAM(S): at 15:51

## 2020-11-01 RX ADMIN — Medication 1 PACKET(S): at 00:56

## 2020-11-01 RX ADMIN — ENOXAPARIN SODIUM 40 MILLIGRAM(S): 100 INJECTION SUBCUTANEOUS at 17:35

## 2020-11-01 RX ADMIN — SODIUM CHLORIDE 2 GRAM(S): 9 INJECTION INTRAMUSCULAR; INTRAVENOUS; SUBCUTANEOUS at 11:41

## 2020-11-01 RX ADMIN — Medication 2 SPRAY(S): at 17:35

## 2020-11-01 RX ADMIN — Medication 2 SPRAY(S): at 00:57

## 2020-11-01 RX ADMIN — ATORVASTATIN CALCIUM 10 MILLIGRAM(S): 80 TABLET, FILM COATED ORAL at 22:33

## 2020-11-01 RX ADMIN — OXYCODONE HYDROCHLORIDE 10 MILLIGRAM(S): 5 TABLET ORAL at 16:20

## 2020-11-01 RX ADMIN — OXYCODONE HYDROCHLORIDE 10 MILLIGRAM(S): 5 TABLET ORAL at 10:03

## 2020-11-01 RX ADMIN — SODIUM CHLORIDE 2 GRAM(S): 9 INJECTION INTRAMUSCULAR; INTRAVENOUS; SUBCUTANEOUS at 17:35

## 2020-11-01 RX ADMIN — Medication 1 PACKET(S): at 05:23

## 2020-11-01 RX ADMIN — OXYCODONE HYDROCHLORIDE 5 MILLIGRAM(S): 5 TABLET ORAL at 05:52

## 2020-11-01 RX ADMIN — LOSARTAN POTASSIUM 50 MILLIGRAM(S): 100 TABLET, FILM COATED ORAL at 05:23

## 2020-11-01 RX ADMIN — OXYCODONE HYDROCHLORIDE 10 MILLIGRAM(S): 5 TABLET ORAL at 15:50

## 2020-11-01 RX ADMIN — Medication 2 SPRAY(S): at 11:42

## 2020-11-01 RX ADMIN — OXYCODONE HYDROCHLORIDE 10 MILLIGRAM(S): 5 TABLET ORAL at 09:33

## 2020-11-01 RX ADMIN — OXYCODONE HYDROCHLORIDE 5 MILLIGRAM(S): 5 TABLET ORAL at 05:24

## 2020-11-01 NOTE — CONSULT NOTE ADULT - PROBLEM SELECTOR RECOMMENDATION 9
Pre op hormonal testing only significant for low testosterone level as per patient. No reports available. Now s/p resection on 11/29/20.   Per team, patient did receive steroids intraoperatively.  Cortisol level checked on POD # 1 was 27.4 and on POD # 2 was 4.1.  Patient is currently hemodynamically stable  - Awaiting repeat AM cortisol, no signs or symptoms of AI at this time  - Follow free T4 and TSH  - Monitor for DI: strict I/Os, BMP qdaily, if urine output more than 500ml/h or 250ml/h for 2 consecutive hours get stat Na and urine osmolality. If Na is >145 and urine osmolality is <300, can give a dose of DDAVP PO 0.05 mg x 1. Urine output noted high this am but Na is 131, consider checking urine osmolality.   - Follow up surgical pathology report  - Check other pituitary hormones including testosterone , prolactin, IGF-1 in 1 week as outpatient.   - Visual field testing and endocrine follow up as outpatient. He will follow with his outpatient endocrinologist Dr. Deejay Kinney.  Discussed with neurosurgery team. Pre op hormonal testing only significant for low testosterone level as per patient. No reports available. Now s/p resection on 11/29/20.   Per team, patient did receive steroids intraoperatively.  Cortisol level checked on POD # 1 was 27.4 and on POD # 2 was 4.1.  Patient is currently hemodynamically stable  - Repeat cortisol this am again noted to be low 2.3, no signs or symptoms of AI at this time but will recommend to start physiologic dose of steroid replacement, prednisone 5 mg qd.   - TFTs consistent with secondary hypothyroidism so start levothyroxine 75 mcg qdaily.   - Monitor for DI: strict I/Os, BMP qdaily, if urine output more than 500ml/h or 250ml/h for 2 consecutive hours get stat Na and urine osmolality. If Na is >145 and urine osmolality is <300, can give a dose of DDAVP PO 0.05 mg x 1. Urine output noted high this am but Na is 131, consider checking urine osmolality.   - Follow up surgical pathology report  - Recheck cortisol along with ACTH and TFTs as outpatient in 4 weeks along with other pituitary hormones including testosterone, prolactin, IGF-1.   - Visual field testing and endocrine follow up as outpatient. He will follow with his outpatient endocrinologist Dr. Deejay Kinney.  Discussed with neurosurgery team. Pre op hormonal testing only significant for low testosterone level as per patient. No reports available. Now s/p resection on 11/29/20.   Per team, patient did receive steroids intraoperatively.  Cortisol level checked on POD # 1 was 27.4 and on POD # 2 was 4.1.  Patient is currently hemodynamically stable  - Repeat cortisol this am again noted to be low 2.3, no signs or symptoms of AI at this time but will recommend to start physiologic dose of steroid replacement, hydrocortisone 10 mg at 8 am and 5 mg at 3 pm  - TFTs consistent with secondary hypothyroidism so start levothyroxine 75 mcg qdaily.   - Monitor for DI: strict I/Os, BMP qdaily, if urine output more than 500ml/h or 250ml/h for 2 consecutive hours get stat Na and urine osmolality. If Na is >145 and urine osmolality is <300, can give a dose of DDAVP PO 0.05 mg x 1. Urine output noted high this am but Na is 131, consider checking urine osmolality.   - Follow up surgical pathology report  - Recheck cortisol along with ACTH and TFTs as outpatient in 4 weeks along with other pituitary hormones including testosterone, prolactin, IGF-1.   - Visual field testing and endocrine follow up as outpatient. He will follow with his outpatient endocrinologist Dr. Deejay Kinney.  Discussed with neurosurgery team. Pre op hormonal testing only significant for low testosterone level as per patient. No reports available. Now s/p resection on 11/29/20.   Per team, patient did receive steroids intraoperatively.  Cortisol level checked on POD # 1 was 27.4 and on POD # 2 was 4.1.  Patient is currently hemodynamically stable  - Repeat cortisol this am again noted to be low 2.3, no signs or symptoms of AI at this time but will recommend to start physiologic dose of steroid replacement, hydrocortisone 10 mg at 8 am and 5 mg at 3 pm  - TFTs consistent with secondary hypothyroidism so start levothyroxine 75 mcg qdaily.   - Monitor for DI: strict I/Os, BMP qdaily, if urine output more than 500ml/h or 250ml/h for 2 consecutive hours get stat Na and urine osmolality/specific gravity. If Na is >145 and urine osmolality is <300, can give a dose of DDAVP PO 0.05 mg x 1. Urine output noted high this am but Na is 131, consider checking urine osmolality.   - Follow up surgical pathology report  - Recheck HPA axis and TFTs as outpatient in a month or so along with other pituitary hormones including testosterone, prolactin, IGF-1.   - Visual field testing and endocrine follow up as outpatient. He will follow with his outpatient endocrinologist Dr. Deejay Kinney.  Discussed with neurosurgery team.

## 2020-11-01 NOTE — PROGRESS NOTE ADULT - SUBJECTIVE AND OBJECTIVE BOX
ENT ISSUE/POD:  TSRP with left nasoseptal flap for small CSF leak POD#3      HPI: 48 y/o male with h/o HTN, T2DN, Hyperlipidemia c/o pituitary macroadenoma found upon workup for a low testosterone level, denies headaches or difficulties with his vision. Today he presents  for scheduled TSRP with left nasoseptal flap for small CSF leak  POD#2. Pt. was seen post op for small amount of epistaxis from left nare. Pt. was packed in the OR with nasopore but continued to bleed from left nare intermittently controlled with a small amount of nasopore. Pt. denies salty taste in mouth , headaches, N/V.            PAST MEDICAL & SURGICAL HISTORY:  Pituitary macroadenoma    History of high cholesterol    HTN (hypertension)    T2DM (type 2 diabetes mellitus)    No significant past surgical history      Allergies    No Known Allergies    Intolerances      MEDICATIONS  (STANDING):  atorvastatin 10 milliGRAM(s) Oral at bedtime  bisacodyl 5 milliGRAM(s) Oral at bedtime  dextrose 5%. 1000 milliLiter(s) (50 mL/Hr) IV Continuous <Continuous>  dextrose 50% Injectable 12.5 Gram(s) IV Push once  dextrose 50% Injectable 25 Gram(s) IV Push once  dextrose 50% Injectable 25 Gram(s) IV Push once  enoxaparin Injectable 40 milliGRAM(s) SubCutaneous <User Schedule>  influenza   Vaccine 0.5 milliLiter(s) IntraMuscular once  insulin lispro (ADMELOG) corrective regimen sliding scale   SubCutaneous three times a day before meals  insulin lispro (ADMELOG) corrective regimen sliding scale   SubCutaneous at bedtime  losartan 50 milliGRAM(s) Oral daily  metoprolol succinate ER 50 milliGRAM(s) Oral daily  senna 2 Tablet(s) Oral at bedtime  sodium chloride 0.65% Nasal 2 Spray(s) Both Nostrils four times a day    MEDICATIONS  (PRN):  dextrose 40% Gel 15 Gram(s) Oral once PRN Blood Glucose LESS THAN 70 milliGRAM(s)/deciliter  glucagon  Injectable 1 milliGRAM(s) IntraMuscular once PRN Glucose LESS THAN 70 milligrams/deciliter  guaiFENesin   Syrup  (Sugar-Free) 100 milliGRAM(s) Oral every 6 hours PRN Cough  ondansetron Injectable 4 milliGRAM(s) IV Push every 6 hours PRN Nausea and/or Vomiting  oxyCODONE    IR 5 milliGRAM(s) Oral every 4 hours PRN Moderate Pain (4 - 6)  oxyCODONE    IR 10 milliGRAM(s) Oral every 4 hours PRN Severe Pain (7 - 10)      Social History: see consult    Family history: see consult    ROS:   ENT: all negative except as noted in HPI   Pulm: denies SOB, cough, hemoptysis  Neuro: denies numbness/tingling, loss of sensation  Endo: denies heat/cold intolerance, excessive sweating      Vital Signs Last 24 Hrs  T(C): 36.4 (01 Nov 2020 04:25), Max: 36.4 (31 Oct 2020 23:06)  T(F): 97.6 (01 Nov 2020 04:25), Max: 97.6 (01 Nov 2020 04:25)  HR: 63 (01 Nov 2020 04:25) (63 - 81)  BP: 160/93 (01 Nov 2020 04:25) (146/96 - 173/95)  BP(mean): --  RR: 18 (01 Nov 2020 04:25) (17 - 18)  SpO2: 96% (01 Nov 2020 04:25) (95% - 96%)                          15.6   15.69 )-----------( 283      ( 31 Oct 2020 06:18 )             47.1    10-31    132<L>  |  93<L>  |  21  ----------------------------<  115<H>  4.2   |  23  |  0.75    Ca    9.1      31 Oct 2020 18:43  Phos  1.7     10-31  Mg     2.4     10-31         PHYSICAL EXAM:  Gen: NAD  Skin: No rashes, bruises, or lesions  Head: Normocephalic, Atraumatic  Face: no edema, erythema, or fluctuance. Parotid glands soft without mass  Eyes: no scleral injection  Nose: Right: nasopore placed in OR, unable to visualize  packing,, no active bleeding            Left: small amt. of bleeding noted, nasopore was added anteriorly to control bleeding, mustache dressing applied  Mouth: No Stridor / Drooling / Trismus.  Mucosa moist, tongue/uvula midline, oropharynx clear with no blood in oropharynx  Neck: Flat, supple, no lymphadenopathy, trachea midline, no masses  Lymphatic: No lymphadenopathy  Resp: breathing easily, no stridor  Neuro: facial nerve intact, no facial droop

## 2020-11-01 NOTE — CONSULT NOTE ADULT - ATTENDING COMMENTS
Patient seen at bedside. Received dexamethasone on day of TSR (10/29), AM cortisol remains low at this time, several days later, suggestive of secondary AI. Start hydrocortisone as described above. Also with secondary hypothyroidism, start LT4. C/w DI watch. Recheck pituitary labs as outpatient. C/w low correction scale qac and qhs.    Antolin Syed MD   Pager # [phone]  On evenings and weekends, please call the office at [phone] or page endocrine fellow on call. Please note that this patient may be followed by different provider tomorrow. If no answer, contact the office.

## 2020-11-01 NOTE — CONSULT NOTE ADULT - ASSESSMENT
50 y/o male with h/o HTN, T2DM, Hyperlipidemia, presented with c/o pituitary macroadenoma found upon workup for a low testosterone level, now s/p resection on 11/29/20. Endocrine team consulted for post op evaluation.

## 2020-11-01 NOTE — CONSULT NOTE ADULT - PROBLEM SELECTOR RECOMMENDATION 2
- Well controlled, A1c 7% 10/2020, on Metformin and Jardiance as outopatient, continue with [facility] correctional scale as inpatient, FS at goal 100-180. - Low am cortisol noted on POD # 3 after receiving steroids intraoperatively, likely indicating secondary AI.   - Would recommend to start prednisone 5 mg qdaily   - Recheck cortisol and ACTH as outpatient in 4 weeks and follow up with outpatient endocrinologist. - Low am cortisol noted on POD # 3 after receiving steroids intraoperatively, likely indicating secondary AI.   - Would recommend to start hydrocortisone 10 mg at 8 am and 5 mg at 3 pm  - Sick day rules counselling   - Recheck cortisol and ACTH as outpatient in 4 weeks and follow up with outpatient endocrinologist - Low am cortisol noted on POD # 3 after receiving steroids intraoperatively, likely indicating secondary AI.   - Would recommend to start hydrocortisone 10 mg at 8 am and 5 mg at 3 pm  - Sick day rules counselling   - Recheck HPA axis as outpatient and follow up with outpatient endocrinologist

## 2020-11-01 NOTE — CONSULT NOTE ADULT - PROBLEM SELECTOR RECOMMENDATION 4
- Goal LDL <70, on statin, monitor lipid profile routinely. - Recheck testosterone levels as outpatient and resume replacement therapy.

## 2020-11-01 NOTE — CONSULT NOTE ADULT - PROBLEM SELECTOR RECOMMENDATION 3
- BP above goal <130/80 on current anti-HTN medications, adjustment as per primary team. - Start levothyroxine 75 mcg qdaily, to be given on empty stomach and wait an hr before eating or taking other medications and at least 4 hrs apart from MVI and calcium.   - Recheck TFTs in 4 weeks as outpatient - Start levothyroxine 75 mcg qdaily, to be given on empty stomach and wait 30 minutes before eating or taking other medications and at least 4 hrs apart from MVI and calcium.   - Recheck TFTs as outpatient

## 2020-11-01 NOTE — CONSULT NOTE ADULT - PROBLEM SELECTOR PROBLEM 2
Type 2 diabetes mellitus without complication, without long-term current use of insulin Secondary adrenal insufficiency

## 2020-11-01 NOTE — OCCUPATIONAL THERAPY INITIAL EVALUATION ADULT - PRECAUTIONS/LIMITATIONS, REHAB EVAL
48 y/o male with h/o HTN, T2DN, Hyperlipidemia c/o pituitary macroadenoma found upon workup for a low testosterone level, denies headaches or difficulties with his vision. Today he presents  for scheduled TSRP with left nasoseptal flap for small CSF leak  POD#2. Pt. was seen post op for small amount of epistaxis from left nare. Pt. was packed in the OR with nasopore but continued to bleed from left nare intermittently controlled with a small amount of nasopore. Pt. denies salty taste in mouth , headaches, N/V. surgical precautions/50 y/o male with h/o HTN, T2DN, Hyperlipidemia c/o pituitary macroadenoma found upon workup for a low testosterone level, denies headaches or difficulties with his vision. Today he presents  for scheduled TSRP with left nasoseptal flap for small CSF leak  POD#2. Pt. was seen post op for small amount of epistaxis from left nare. Pt. was packed in the OR with nasopore but continued to bleed from left nare intermittently controlled with a small amount of nasopore. Pt. denies salty taste in mouth , headaches, N/V.

## 2020-11-01 NOTE — CONSULT NOTE ADULT - SUBJECTIVE AND OBJECTIVE BOX
HPI:  48 y/o male with h/o HTN, T2DM, Hyperlipidemia, presented with c/o pituitary macroadenoma found upon workup for a low testosterone level, now s/p resection on 11/29/20. Endocrine team consulted for post op evaluation.       Endocrine history:  Patient was told of low testosterone level back in Feb 2020 when he was being evaluated for erectile dysfunction. Per patient, other hormones were checked and wnl (no reports available). He is following with Endocrinologist Dr. Deejay Kinney in [place]. Was started on biweekly testosterone gel for ED and low testosterone level. Denies any headache or blurry vision over past few months. Underwent 1.8 cm pituitary macroadenoma resection on 11/29/20. Per team, patient did receive steroids intraoperatively. Cortisol level checked on POD # 1 was 27.4 and on POD # 2 was 4.1.  Today it is POD # 3. AM cortisol and TFTs checked this am and are pending. Currently patient reports feeling well and he is hemodynamically stable. Has nasal packing in place and endorses dry mouth and polydipsia secondary to that. Denies any polyuria, dizziness, headache, GI or  complaints.     Also has history of T2DM, on Metformin and Jardiance, a1c was 7% 10/2020, says its been well controlled.       PAST MEDICAL & SURGICAL HISTORY:  Pituitary macroadenoma  History of high cholesterol  HTN (hypertension)  T2DM (type 2 diabetes mellitus)  No significant past surgical history      FAMILY HISTORY:  No pertinent family history of any pituitary disorders       Social History:  Denies smoking or illicit drug use.      Outpatient Medications:  · 	metFORMIN 500 mg oral tablet: Last Dose Taken:  , 3 tab(s) orally once a day (at bedtime)  · 	Jardiance 25 mg oral tablet: Last Dose Taken:  , 1 tab(s) orally once a day (in the morning)  · 	metoprolol succinate 50 mg oral tablet, extended release: Last Dose Taken:  , 1 tab(s) orally once a day  · 	olmesartan 20 mg oral tablet: Last Dose Taken:  , 1 tab(s) orally once a day  · 	atorvastatin 10 mg oral tablet: Last Dose Taken:  , 1 tab(s) orally once a day      MEDICATIONS  (STANDING):  atorvastatin 10 milliGRAM(s) Oral at bedtime  bisacodyl 5 milliGRAM(s) Oral at bedtime  dextrose 5%. 1000 milliLiter(s) (50 mL/Hr) IV Continuous <Continuous>  dextrose 50% Injectable 12.5 Gram(s) IV Push once  dextrose 50% Injectable 25 Gram(s) IV Push once  dextrose 50% Injectable 25 Gram(s) IV Push once  enoxaparin Injectable 40 milliGRAM(s) SubCutaneous <User Schedule>  influenza   Vaccine 0.5 milliLiter(s) IntraMuscular once  insulin lispro (ADMELOG) corrective regimen sliding scale   SubCutaneous three times a day before meals  insulin lispro (ADMELOG) corrective regimen sliding scale   SubCutaneous at bedtime  losartan 50 milliGRAM(s) Oral daily  metoprolol succinate ER 50 milliGRAM(s) Oral daily  senna 2 Tablet(s) Oral at bedtime  sodium chloride 2 Gram(s) Oral two times a day  sodium chloride 0.65% Nasal 2 Spray(s) Both Nostrils four times a day    MEDICATIONS  (PRN):  dextrose 40% Gel 15 Gram(s) Oral once PRN Blood Glucose LESS THAN 70 milliGRAM(s)/deciliter  glucagon  Injectable 1 milliGRAM(s) IntraMuscular once PRN Glucose LESS THAN 70 milligrams/deciliter  guaiFENesin   Syrup  (Sugar-Free) 100 milliGRAM(s) Oral every 6 hours PRN Cough  ondansetron Injectable 4 milliGRAM(s) IV Push every 6 hours PRN Nausea and/or Vomiting  oxyCODONE    IR 5 milliGRAM(s) Oral every 4 hours PRN Moderate Pain (4 - 6)  oxyCODONE    IR 10 milliGRAM(s) Oral every 4 hours PRN Severe Pain (7 - 10)      Allergies  No Known Allergies    Review of Systems:  Constitutional: No fever, good appetite/po intake  Eyes: No blurry vision, diplopia  Neuro: No tremors  HEENT: No pain, + dry mouth   Cardiovascular: No chest pain, no palpitations  Respiratory: No SOB, no cough  GI: No nausea, no vomiting   : No dysuria, hematuria  Endocrine: no polyuria, + polydipsia  Hem/lymph: no swelling  ALL OTHER SYSTEMS REVIEWED AND NEGATIVE      PHYSICAL EXAM:  VITALS: T(C): 36.6 (11-01-20 @ 08:16)  T(F): 97.9 (11-01-20 @ 08:16), Max: 97.9 (11-01-20 @ 08:16)  HR: 61 (11-01-20 @ 08:16) (61 - 77)  BP: 158/84 (11-01-20 @ 08:16) (146/96 - 173/95)  RR:  (17 - 20)  SpO2:  (95% - 96%)  Wt(kg): --  GENERAL: NAD, well-groomed, well-developed  EYES: No proptosis, anicteric  HEENT: + nasal packing in place , normocephalic   THYROID: Normal size, no palpable nodules  RESPIRATORY: Clear to auscultation bilaterally; No rales, rhonchi, wheezing, or rubs  CARDIOVASCULAR: Regular rate and rhythm; No murmurs; no peripheral edema  GI: Soft, nontender, non distended, normal bowel sounds  SKIN: Dry, intact, No rashes or lesions  NEURO: AAO x 3 , no gross or focal deficit   PSYCH: reactive affect, euthymic mood      POCT Blood Glucose.: 102 mg/dL (11-01-20 @ 08:36)  POCT Blood Glucose.: 112 mg/dL (10-31-20 @ 21:30)  POCT Blood Glucose.: 109 mg/dL (10-31-20 @ 17:24)  POCT Blood Glucose.: 117 mg/dL (10-31-20 @ 12:45)  POCT Blood Glucose.: 110 mg/dL (10-31-20 @ 08:47)  POCT Blood Glucose.: 126 mg/dL (10-30-20 @ 21:01)  POCT Blood Glucose.: 126 mg/dL (10-30-20 @ 17:15)  POCT Blood Glucose.: 129 mg/dL (10-30-20 @ 14:12)  POCT Blood Glucose.: 158 mg/dL (10-30-20 @ 08:06)  POCT Blood Glucose.: 141 mg/dL (10-29-20 @ 21:05)  POCT Blood Glucose.: 156 mg/dL (10-29-20 @ 17:29)                            15.6   15.69 )-----------( 283      ( 31 Oct 2020 06:18 )             47.1       11-01    131<L>  |  91<L>  |  18  ----------------------------<  85  3.7   |  22  |  0.62    EGFR if : 135  EGFR if non : 116    Ca    8.8      11-01  Mg     2.0     11-01  Phos  2.0     11-01      Cortisol AM, Serum: 4.1 ug/dL (10.31.20 @ 12:05)    Cortisol AM, Serum: 27.4 ug/dL (10.30.20 @ 09:48)               HPI:  48 y/o male with h/o HTN, T2DM, Hyperlipidemia, presented with c/o pituitary macroadenoma found upon workup for a low testosterone level, now s/p resection on 11/29/20. Endocrine team consulted for post op evaluation.       Endocrine history:  Patient was told of low testosterone level back in Feb 2020 when he was being evaluated for erectile dysfunction. Per patient, other hormones were checked and wnl (no reports available). He is following with Endocrinologist Dr. Deejay Kinney in [place]. Was started on biweekly testosterone gel for ED and low testosterone level. Denies any headache or blurry vision over past few months. Underwent 1.8 cm pituitary macroadenoma resection on 11/29/20. Per team, patient did receive steroids intraoperatively. Cortisol level checked on POD # 1 was 27.4 and on POD # 2 was 4.1.  Today it is POD # 3. AM cortisol and TFTs checked this am and are pending. Currently patient reports feeling well and he is hemodynamically stable. Has nasal packing in place and endorses dry mouth and polydipsia secondary to that. Denies any polyuria, dizziness, headache, GI or  complaints.     Also has history of T2DM, on Metformin and Jardiance, a1c was 7% 10/2020, says its been well controlled.       PAST MEDICAL & SURGICAL HISTORY:  Pituitary macroadenoma  History of high cholesterol  HTN (hypertension)  T2DM (type 2 diabetes mellitus)  No significant past surgical history      FAMILY HISTORY:  No pertinent family history of any pituitary disorders       Social History:  Denies smoking or illicit drug use.      Outpatient Medications:  · 	metFORMIN 500 mg oral tablet: Last Dose Taken:  , 3 tab(s) orally once a day (at bedtime)  · 	Jardiance 25 mg oral tablet: Last Dose Taken:  , 1 tab(s) orally once a day (in the morning)  · 	metoprolol succinate 50 mg oral tablet, extended release: Last Dose Taken:  , 1 tab(s) orally once a day  · 	olmesartan 20 mg oral tablet: Last Dose Taken:  , 1 tab(s) orally once a day  · 	atorvastatin 10 mg oral tablet: Last Dose Taken:  , 1 tab(s) orally once a day      MEDICATIONS  (STANDING):  atorvastatin 10 milliGRAM(s) Oral at bedtime  bisacodyl 5 milliGRAM(s) Oral at bedtime  dextrose 5%. 1000 milliLiter(s) (50 mL/Hr) IV Continuous <Continuous>  dextrose 50% Injectable 12.5 Gram(s) IV Push once  dextrose 50% Injectable 25 Gram(s) IV Push once  dextrose 50% Injectable 25 Gram(s) IV Push once  enoxaparin Injectable 40 milliGRAM(s) SubCutaneous <User Schedule>  influenza   Vaccine 0.5 milliLiter(s) IntraMuscular once  insulin lispro (ADMELOG) corrective regimen sliding scale   SubCutaneous three times a day before meals  insulin lispro (ADMELOG) corrective regimen sliding scale   SubCutaneous at bedtime  losartan 50 milliGRAM(s) Oral daily  metoprolol succinate ER 50 milliGRAM(s) Oral daily  senna 2 Tablet(s) Oral at bedtime  sodium chloride 2 Gram(s) Oral two times a day  sodium chloride 0.65% Nasal 2 Spray(s) Both Nostrils four times a day    MEDICATIONS  (PRN):  dextrose 40% Gel 15 Gram(s) Oral once PRN Blood Glucose LESS THAN 70 milliGRAM(s)/deciliter  glucagon  Injectable 1 milliGRAM(s) IntraMuscular once PRN Glucose LESS THAN 70 milligrams/deciliter  guaiFENesin   Syrup  (Sugar-Free) 100 milliGRAM(s) Oral every 6 hours PRN Cough  ondansetron Injectable 4 milliGRAM(s) IV Push every 6 hours PRN Nausea and/or Vomiting  oxyCODONE    IR 5 milliGRAM(s) Oral every 4 hours PRN Moderate Pain (4 - 6)  oxyCODONE    IR 10 milliGRAM(s) Oral every 4 hours PRN Severe Pain (7 - 10)      Allergies  No Known Allergies    Review of Systems:  Constitutional: No fever, good appetite/po intake  Eyes: No blurry vision, diplopia  Neuro: No tremors  HEENT: No pain, + dry mouth   Cardiovascular: No chest pain, no palpitations  Respiratory: No SOB, no cough  GI: No nausea, no vomiting   : No dysuria, hematuria  Endocrine: no polyuria, + polydipsia  Hem/lymph: no swelling  ALL OTHER SYSTEMS REVIEWED AND NEGATIVE      PHYSICAL EXAM:  VITALS: T(C): 36.6 (11-01-20 @ 08:16)  T(F): 97.9 (11-01-20 @ 08:16), Max: 97.9 (11-01-20 @ 08:16)  HR: 61 (11-01-20 @ 08:16) (61 - 77)  BP: 158/84 (11-01-20 @ 08:16) (146/96 - 173/95)  RR:  (17 - 20)  SpO2:  (95% - 96%)  Wt(kg): --  GENERAL: NAD, well-groomed, well-developed  EYES: No proptosis, anicteric  HEENT: + nasal packing in place , normocephalic   THYROID: Normal size, no palpable nodules  RESPIRATORY: Clear to auscultation bilaterally; No rales, rhonchi, wheezing, or rubs  CARDIOVASCULAR: Regular rate and rhythm; No murmurs; no peripheral edema  GI: Soft, nontender, non distended, normal bowel sounds  SKIN: Dry, intact, No rashes or lesions  NEURO: AAO x 3 , no gross or focal deficit   PSYCH: reactive affect, euthymic mood      POCT Blood Glucose.: 102 mg/dL (11-01-20 @ 08:36)  POCT Blood Glucose.: 112 mg/dL (10-31-20 @ 21:30)  POCT Blood Glucose.: 109 mg/dL (10-31-20 @ 17:24)  POCT Blood Glucose.: 117 mg/dL (10-31-20 @ 12:45)  POCT Blood Glucose.: 110 mg/dL (10-31-20 @ 08:47)  POCT Blood Glucose.: 126 mg/dL (10-30-20 @ 21:01)  POCT Blood Glucose.: 126 mg/dL (10-30-20 @ 17:15)  POCT Blood Glucose.: 129 mg/dL (10-30-20 @ 14:12)  POCT Blood Glucose.: 158 mg/dL (10-30-20 @ 08:06)  POCT Blood Glucose.: 141 mg/dL (10-29-20 @ 21:05)  POCT Blood Glucose.: 156 mg/dL (10-29-20 @ 17:29)                            15.6   15.69 )-----------( 283      ( 31 Oct 2020 06:18 )             47.1       11-01    131<L>  |  91<L>  |  18  ----------------------------<  85  3.7   |  22  |  0.62    EGFR if : 135  EGFR if non : 116    Ca    8.8      11-01  Mg     2.0     11-01  Phos  2.0     11-01      Cortisol AM, Serum: 4.1 ug/dL (10.31.20 @ 12:05)  Cortisol AM, Serum: 27.4 ug/dL (10.30.20 @ 09:48)  Cortisol AM, Serum: 2.3 ug/dL (11.01.20 @ 12:02)    Thyroid Stimulating Hormone, Serum: 0.48 uIU/mL (11.01.20 @ 12:02)  Free Thyroxine, Serum: 0.6 ng/dL (11.01.20 @ 12:02)  Triiodothyronine, Total (T3 Total): 47 ng/dL (11.01.20 @ 12:02)               HPI:  50 y/o man with h/o HTN, HLD, T2DM, Hyperlipidemia, presented with c/o pituitary macroadenoma found upon workup for a low testosterone level, now s/p resection on 11/29/20. Endocrine team consulted for post op evaluation.       Endocrine history:  Patient was told of low testosterone level back in Feb 2020 when he was being evaluated for erectile dysfunction. Per patient, other hormones were checked and wnl (no reports available). He is following with Endocrinologist Dr. Deejay Kinney in [place]. Was started on testosterone gel for ED and low testosterone level. Denies any headache or blurry vision over past few months. Underwent 1.8 cm pituitary macroadenoma resection on 11/29/20. Per team, patient did receive steroids intraoperatively. Cortisol level checked on POD # 1 was 27.4 and on POD # 2 was 4.1.  Today it is POD # 3. AM cortisol and TFTs checked this am and are pending. Currently patient reports feeling well and he is hemodynamically stable. Has nasal packing in place and endorses dry mouth and polydipsia secondary to that. Denies any polyuria, dizziness, headache, GI or  complaints.     Also has history of T2DM, on Metformin and Jardiance, a1c was 7% 10/2020, says its been well controlled, no known complications.    PAST MEDICAL & SURGICAL HISTORY:  Pituitary macroadenoma  History of high cholesterol  HTN (hypertension)  T2DM (type 2 diabetes mellitus)  No significant past surgical history      FAMILY HISTORY:  No pertinent family history of any pituitary disorders     Social History:  Denies smoking or illicit drug use.    Outpatient Medications:  · 	metFORMIN 500 mg oral tablet: Last Dose Taken:  , 3 tab(s) orally once a day (at bedtime)  · 	Jardiance 25 mg oral tablet: Last Dose Taken:  , 1 tab(s) orally once a day (in the morning)  · 	metoprolol succinate 50 mg oral tablet, extended release: Last Dose Taken:  , 1 tab(s) orally once a day  · 	olmesartan 20 mg oral tablet: Last Dose Taken:  , 1 tab(s) orally once a day  · 	atorvastatin 10 mg oral tablet: Last Dose Taken:  , 1 tab(s) orally once a day      MEDICATIONS  (STANDING):  atorvastatin 10 milliGRAM(s) Oral at bedtime  bisacodyl 5 milliGRAM(s) Oral at bedtime  dextrose 5%. 1000 milliLiter(s) (50 mL/Hr) IV Continuous <Continuous>  dextrose 50% Injectable 12.5 Gram(s) IV Push once  dextrose 50% Injectable 25 Gram(s) IV Push once  dextrose 50% Injectable 25 Gram(s) IV Push once  enoxaparin Injectable 40 milliGRAM(s) SubCutaneous <User Schedule>  influenza   Vaccine 0.5 milliLiter(s) IntraMuscular once  insulin lispro (ADMELOG) corrective regimen sliding scale   SubCutaneous three times a day before meals  insulin lispro (ADMELOG) corrective regimen sliding scale   SubCutaneous at bedtime  losartan 50 milliGRAM(s) Oral daily  metoprolol succinate ER 50 milliGRAM(s) Oral daily  senna 2 Tablet(s) Oral at bedtime  sodium chloride 2 Gram(s) Oral two times a day  sodium chloride 0.65% Nasal 2 Spray(s) Both Nostrils four times a day    MEDICATIONS  (PRN):  dextrose 40% Gel 15 Gram(s) Oral once PRN Blood Glucose LESS THAN 70 milliGRAM(s)/deciliter  glucagon  Injectable 1 milliGRAM(s) IntraMuscular once PRN Glucose LESS THAN 70 milligrams/deciliter  guaiFENesin   Syrup  (Sugar-Free) 100 milliGRAM(s) Oral every 6 hours PRN Cough  ondansetron Injectable 4 milliGRAM(s) IV Push every 6 hours PRN Nausea and/or Vomiting  oxyCODONE    IR 5 milliGRAM(s) Oral every 4 hours PRN Moderate Pain (4 - 6)  oxyCODONE    IR 10 milliGRAM(s) Oral every 4 hours PRN Severe Pain (7 - 10)      Allergies  No Known Allergies    Review of Systems:  Constitutional: No fever, good appetite/po intake  Eyes: No blurry vision, diplopia  Neuro: No tremors  HEENT: No pain, + dry mouth   Cardiovascular: No chest pain, no palpitations  Respiratory: No SOB, no cough  GI: No nausea, no vomiting   : No dysuria, hematuria  Endocrine: no polyuria, + polydipsia  Hem/lymph: no swelling    ALL OTHER SYSTEMS REVIEWED AND NEGATIVE      PHYSICAL EXAM:  VITALS: T(C): 36.6 (11-01-20 @ 08:16)  T(F): 97.9 (11-01-20 @ 08:16), Max: 97.9 (11-01-20 @ 08:16)  HR: 61 (11-01-20 @ 08:16) (61 - 77)  BP: 158/84 (11-01-20 @ 08:16) (146/96 - 173/95)  RR:  (17 - 20)  SpO2:  (95% - 96%)  Wt(kg): --  GENERAL: NAD, well-groomed, well-developed  EYES: No proptosis, anicteric  HEENT: + nasal packing in place , normocephalic   THYROID: Normal size, no palpable nodules  RESPIRATORY: Clear to auscultation bilaterally; No rales, rhonchi, wheezing, or rubs  CARDIOVASCULAR: Regular rate and rhythm; No murmurs; no peripheral edema  GI: Soft, nontender, non distended, normal bowel sounds  SKIN: Dry, intact, No rashes or lesions  NEURO: AAO x 3 , no gross or focal deficit   PSYCH: reactive affect, euthymic mood      POCT Blood Glucose.: 102 mg/dL (11-01-20 @ 08:36)  POCT Blood Glucose.: 112 mg/dL (10-31-20 @ 21:30)  POCT Blood Glucose.: 109 mg/dL (10-31-20 @ 17:24)  POCT Blood Glucose.: 117 mg/dL (10-31-20 @ 12:45)  POCT Blood Glucose.: 110 mg/dL (10-31-20 @ 08:47)  POCT Blood Glucose.: 126 mg/dL (10-30-20 @ 21:01)  POCT Blood Glucose.: 126 mg/dL (10-30-20 @ 17:15)  POCT Blood Glucose.: 129 mg/dL (10-30-20 @ 14:12)  POCT Blood Glucose.: 158 mg/dL (10-30-20 @ 08:06)  POCT Blood Glucose.: 141 mg/dL (10-29-20 @ 21:05)  POCT Blood Glucose.: 156 mg/dL (10-29-20 @ 17:29)                            15.6   15.69 )-----------( 283      ( 31 Oct 2020 06:18 )             47.1       11-01    131<L>  |  91<L>  |  18  ----------------------------<  85  3.7   |  22  |  0.62    EGFR if : 135  EGFR if non : 116    Ca    8.8      11-01  Mg     2.0     11-01  Phos  2.0     11-01      Cortisol AM, Serum: 4.1 ug/dL (10.31.20 @ 12:05)  Cortisol AM, Serum: 27.4 ug/dL (10.30.20 @ 09:48)  Cortisol AM, Serum: 2.3 ug/dL (11.01.20 @ 12:02)    Thyroid Stimulating Hormone, Serum: 0.48 uIU/mL (11.01.20 @ 12:02)  Free Thyroxine, Serum: 0.6 ng/dL (11.01.20 @ 12:02)  Triiodothyronine, Total (T3 Total): 47 ng/dL (11.01.20 @ 12:02)

## 2020-11-01 NOTE — CONSULT NOTE ADULT - PROBLEM SELECTOR RECOMMENDATION 6
- BP above goal <130/80 on current anti-HTN medications, adjustment as per primary team. - BP above goal <140/90 on current anti-HTN medications, adjustment as per primary team.

## 2020-11-01 NOTE — CONSULT NOTE ADULT - PROBLEM SELECTOR RECOMMENDATION 5
- Well controlled, A1c 7% 10/2020, on Metformin and Jardiance as outopatient, [facility] as inpatient, FS at goal 100-180. - Well controlled, A1c 7% 10/2020, on Metformin and Jardiance as outpatient  - [facility], FS at goal 100-180.  - consistent carb diet  - check FS qac and qhs  - plan to dc on home regimen

## 2020-11-01 NOTE — PROGRESS NOTE ADULT - ASSESSMENT
48 y/o male with h/o HTN, T2DN, Hyperlipidemia found to have pituitary macroadenoma following outpatient workup for low Testosterone now s/p endoscopic TSP resection c/b CSF leak repair w/ nasal septal flap+ duragen closure on 10/29/2020.      NEURO:  - neuro check q4  - post op MRI on 10/30 revealed the residual soft tissue in the sella and suprasellar region appears of similar size with only minimal peripheral enhancement consistent with postoperative resection without interval loss of height. There is no change in mild mass effect on the optic chiasm. No acute infarcts. Normal intracranial enhancemen  - pain management with tylenol and oxycodone prn  - TSP/skull base precautions: no straws, nasal instrumentation BiPAP, no incentive spirometer  - DI watch   - watch for CSF leak   - PT/OT evaluation    PULMONARY:  - saturating well on RA   - pituitary precaution     CARDIOVASCULAR:  - HTN, c/w metoprolol 50mg qd, losartan 50mg qd  - HLD, c/w Atorvastatin 10mg    GASTROENTEROLOGY:  - tolerating regular diet   - senna and miralax for bowel regimens    ENDOCRINE:  - DI watch: currently no evidence of DI; if UO> 300 ml in 2 consecutive hours , will send BMP, urine SG   - patient was given dexamethasone intra-OP, AM cortisol on 10/30 27.4, today am cortisol was 4.1, patient currently hemodynamically stable, will f/u am cortisol tomorrow, endocrine consult appreciated, will f/u recommendations  - T2DM, Aa1c 7%, continue HISS     RENAL:  - strict I&O for DI watch  - BMP Q12hrs      INFECTIOUS:  - afebrile    HEME/ONC:  - DVT ppx: b/l SCDs and SQL    Disposition: No PT needs, will d/c home once medically cleared    will discuss above with Dr. Gabe palenciaink [id number]

## 2020-11-02 DIAGNOSIS — E87.1 HYPO-OSMOLALITY AND HYPONATREMIA: ICD-10-CM

## 2020-11-02 LAB
ANION GAP SERPL CALC-SCNC: 15 MMOL/L — SIGNIFICANT CHANGE UP (ref 5–17)
ANION GAP SERPL CALC-SCNC: 17 MMOL/L — SIGNIFICANT CHANGE UP (ref 5–17)
ANION GAP SERPL CALC-SCNC: 19 MMOL/L — HIGH (ref 5–17)
BUN SERPL-MCNC: 15 MG/DL — SIGNIFICANT CHANGE UP (ref 7–23)
BUN SERPL-MCNC: 18 MG/DL — SIGNIFICANT CHANGE UP (ref 7–23)
BUN SERPL-MCNC: 20 MG/DL — SIGNIFICANT CHANGE UP (ref 7–23)
CALCIUM SERPL-MCNC: 9.2 MG/DL — SIGNIFICANT CHANGE UP (ref 8.4–10.5)
CALCIUM SERPL-MCNC: 9.6 MG/DL — SIGNIFICANT CHANGE UP (ref 8.4–10.5)
CALCIUM SERPL-MCNC: 9.9 MG/DL — SIGNIFICANT CHANGE UP (ref 8.4–10.5)
CHLORIDE SERPL-SCNC: 87 MMOL/L — LOW (ref 96–108)
CHLORIDE SERPL-SCNC: 89 MMOL/L — LOW (ref 96–108)
CHLORIDE SERPL-SCNC: 92 MMOL/L — LOW (ref 96–108)
CO2 SERPL-SCNC: 17 MMOL/L — LOW (ref 22–31)
CO2 SERPL-SCNC: 21 MMOL/L — LOW (ref 22–31)
CO2 SERPL-SCNC: 23 MMOL/L — SIGNIFICANT CHANGE UP (ref 22–31)
CREAT SERPL-MCNC: 0.69 MG/DL — SIGNIFICANT CHANGE UP (ref 0.5–1.3)
CREAT SERPL-MCNC: 0.88 MG/DL — SIGNIFICANT CHANGE UP (ref 0.5–1.3)
CREAT SERPL-MCNC: 1.02 MG/DL — SIGNIFICANT CHANGE UP (ref 0.5–1.3)
GLUCOSE BLDC GLUCOMTR-MCNC: 127 MG/DL — HIGH (ref 70–99)
GLUCOSE BLDC GLUCOMTR-MCNC: 128 MG/DL — HIGH (ref 70–99)
GLUCOSE BLDC GLUCOMTR-MCNC: 205 MG/DL — HIGH (ref 70–99)
GLUCOSE BLDC GLUCOMTR-MCNC: 209 MG/DL — HIGH (ref 70–99)
GLUCOSE SERPL-MCNC: 114 MG/DL — HIGH (ref 70–99)
GLUCOSE SERPL-MCNC: 186 MG/DL — HIGH (ref 70–99)
GLUCOSE SERPL-MCNC: 188 MG/DL — HIGH (ref 70–99)
POTASSIUM SERPL-MCNC: 3.8 MMOL/L — SIGNIFICANT CHANGE UP (ref 3.5–5.3)
POTASSIUM SERPL-MCNC: 4 MMOL/L — SIGNIFICANT CHANGE UP (ref 3.5–5.3)
POTASSIUM SERPL-MCNC: 4.5 MMOL/L — SIGNIFICANT CHANGE UP (ref 3.5–5.3)
POTASSIUM SERPL-SCNC: 3.8 MMOL/L — SIGNIFICANT CHANGE UP (ref 3.5–5.3)
POTASSIUM SERPL-SCNC: 4 MMOL/L — SIGNIFICANT CHANGE UP (ref 3.5–5.3)
POTASSIUM SERPL-SCNC: 4.5 MMOL/L — SIGNIFICANT CHANGE UP (ref 3.5–5.3)
PROLACTIN SERPL-MCNC: 6.8 NG/ML — SIGNIFICANT CHANGE UP (ref 4.1–18.4)
SODIUM SERPL-SCNC: 125 MMOL/L — LOW (ref 135–145)
SODIUM SERPL-SCNC: 127 MMOL/L — LOW (ref 135–145)
SODIUM SERPL-SCNC: 128 MMOL/L — LOW (ref 135–145)

## 2020-11-02 PROCEDURE — 99232 SBSQ HOSP IP/OBS MODERATE 35: CPT | Mod: GC

## 2020-11-02 PROCEDURE — 99024 POSTOP FOLLOW-UP VISIT: CPT

## 2020-11-02 RX ORDER — LEVOTHYROXINE SODIUM 125 MCG
50 TABLET ORAL DAILY
Refills: 0 | Status: DISCONTINUED | OUTPATIENT
Start: 2020-11-02 | End: 2020-11-04

## 2020-11-02 RX ORDER — HYDROCORTISONE 20 MG
5 TABLET ORAL ONCE
Refills: 0 | Status: COMPLETED | OUTPATIENT
Start: 2020-11-02 | End: 2020-11-02

## 2020-11-02 RX ORDER — HYDROCORTISONE 20 MG
5 TABLET ORAL
Refills: 0 | Status: DISCONTINUED | OUTPATIENT
Start: 2020-11-02 | End: 2020-11-05

## 2020-11-02 RX ORDER — HYDROCORTISONE 20 MG
10 TABLET ORAL
Refills: 0 | Status: DISCONTINUED | OUTPATIENT
Start: 2020-11-02 | End: 2020-11-05

## 2020-11-02 RX ORDER — SODIUM CHLORIDE 5 G/100ML
1000 INJECTION, SOLUTION INTRAVENOUS
Refills: 0 | Status: DISCONTINUED | OUTPATIENT
Start: 2020-11-02 | End: 2020-11-02

## 2020-11-02 RX ORDER — HYDROCORTISONE 20 MG
100 TABLET ORAL ONCE
Refills: 0 | Status: COMPLETED | OUTPATIENT
Start: 2020-11-02 | End: 2020-11-02

## 2020-11-02 RX ORDER — HYDROCORTISONE 20 MG
5 TABLET ORAL DAILY
Refills: 0 | Status: DISCONTINUED | OUTPATIENT
Start: 2020-11-02 | End: 2020-11-02

## 2020-11-02 RX ADMIN — ENOXAPARIN SODIUM 40 MILLIGRAM(S): 100 INJECTION SUBCUTANEOUS at 17:18

## 2020-11-02 RX ADMIN — Medication 2 SPRAY(S): at 17:18

## 2020-11-02 RX ADMIN — Medication 2 SPRAY(S): at 06:47

## 2020-11-02 RX ADMIN — OXYCODONE HYDROCHLORIDE 5 MILLIGRAM(S): 5 TABLET ORAL at 05:34

## 2020-11-02 RX ADMIN — OXYCODONE HYDROCHLORIDE 10 MILLIGRAM(S): 5 TABLET ORAL at 21:53

## 2020-11-02 RX ADMIN — Medication 5 MILLIGRAM(S): at 21:23

## 2020-11-02 RX ADMIN — OXYCODONE HYDROCHLORIDE 10 MILLIGRAM(S): 5 TABLET ORAL at 03:54

## 2020-11-02 RX ADMIN — LOSARTAN POTASSIUM 50 MILLIGRAM(S): 100 TABLET, FILM COATED ORAL at 05:34

## 2020-11-02 RX ADMIN — OXYCODONE HYDROCHLORIDE 10 MILLIGRAM(S): 5 TABLET ORAL at 17:21

## 2020-11-02 RX ADMIN — OXYCODONE HYDROCHLORIDE 10 MILLIGRAM(S): 5 TABLET ORAL at 08:30

## 2020-11-02 RX ADMIN — Medication 5 MILLIGRAM(S): at 07:48

## 2020-11-02 RX ADMIN — OXYCODONE HYDROCHLORIDE 10 MILLIGRAM(S): 5 TABLET ORAL at 03:24

## 2020-11-02 RX ADMIN — Medication 5 MILLIGRAM(S): at 17:18

## 2020-11-02 RX ADMIN — Medication 2 SPRAY(S): at 11:16

## 2020-11-02 RX ADMIN — OXYCODONE HYDROCHLORIDE 5 MILLIGRAM(S): 5 TABLET ORAL at 16:16

## 2020-11-02 RX ADMIN — Medication 50 MILLIGRAM(S): at 05:34

## 2020-11-02 RX ADMIN — SODIUM CHLORIDE 2 GRAM(S): 9 INJECTION INTRAMUSCULAR; INTRAVENOUS; SUBCUTANEOUS at 17:18

## 2020-11-02 RX ADMIN — Medication 4: at 13:05

## 2020-11-02 RX ADMIN — Medication 2 SPRAY(S): at 23:23

## 2020-11-02 RX ADMIN — SODIUM CHLORIDE 2 GRAM(S): 9 INJECTION INTRAMUSCULAR; INTRAVENOUS; SUBCUTANEOUS at 05:34

## 2020-11-02 RX ADMIN — Medication 2 SPRAY(S): at 00:30

## 2020-11-02 RX ADMIN — Medication 100 MILLIGRAM(S): at 15:44

## 2020-11-02 RX ADMIN — OXYCODONE HYDROCHLORIDE 5 MILLIGRAM(S): 5 TABLET ORAL at 06:04

## 2020-11-02 RX ADMIN — Medication 75 MICROGRAM(S): at 05:35

## 2020-11-02 RX ADMIN — OXYCODONE HYDROCHLORIDE 10 MILLIGRAM(S): 5 TABLET ORAL at 07:54

## 2020-11-02 RX ADMIN — Medication 5 MILLIGRAM(S): at 04:33

## 2020-11-02 RX ADMIN — SENNA PLUS 2 TABLET(S): 8.6 TABLET ORAL at 21:23

## 2020-11-02 RX ADMIN — ATORVASTATIN CALCIUM 10 MILLIGRAM(S): 80 TABLET, FILM COATED ORAL at 21:23

## 2020-11-02 RX ADMIN — OXYCODONE HYDROCHLORIDE 10 MILLIGRAM(S): 5 TABLET ORAL at 18:00

## 2020-11-02 RX ADMIN — OXYCODONE HYDROCHLORIDE 5 MILLIGRAM(S): 5 TABLET ORAL at 15:46

## 2020-11-02 RX ADMIN — OXYCODONE HYDROCHLORIDE 10 MILLIGRAM(S): 5 TABLET ORAL at 21:23

## 2020-11-02 NOTE — PROGRESS NOTE ADULT - ASSESSMENT
48 y/o male with h/o HTN, T2DN, Hyperlipidemia c/o pituitary macroadenoma, sp endoscopic endonasal transsphenoidal resection pituitary mass and repair small CSF leak. medicine consult for co management    # HTN  # DM2  # HLD  # pituitary macroadenoma sp endoscopic endonasal transphenoidal resection and CSF leak repair POD #4  # epistaxis  # secondary hypothyroidism  # secondary adrenal insufficiency  # hyponatremia    appreciate NSG recs  at home baseline BP 140s, currently acceptable, cont losartan and BB  cont statin  FS acceptable, cont SSI and corrective SI  pain control and bowel regimen  appreciate endo recs  cont synthroid  cont hydrocortisone  monitor for DI, urinary output has been high, check urine na and osm    PCP Dr. Arndt    Please call [facility] with questions [phone].

## 2020-11-02 NOTE — PROGRESS NOTE ADULT - PROBLEM SELECTOR PLAN 3
- TSH 0.48 11/1/20; continue with levothyroxine 75 mcg qdaily, to be given on empty stomach and wait 30 minutes before eating or taking other medications and at least 4 hrs apart from MVI and calcium.   - Recheck TFTs as outpatient as above - TSH 0.48 11/1/20; decrease levothyroxine from 75 mcg to 50mcg po qdaily, to be given on empty stomach and wait 30 minutes before eating or taking other medications and at least 4 hrs apart from MVI and calcium.   - Recheck TFTs as outpatient as above

## 2020-11-02 NOTE — PROGRESS NOTE ADULT - SUBJECTIVE AND OBJECTIVE BOX
Chief Complaint/Follow-up on: S/p Pitutary Macroadenoma resection    Subjective:  Patient seen and examined at bedside; patient's wife present during exam. No acute events overnight. Patient reports feeling better today; no new complaints and sinus congestion is improving. He states that his mouth remains somewhat dry as he is breathing through his mouth due to the nasal packing/dressing. However, he denies polydipsia, polyuria, increased fatigue, confusion, dizziness, headache. Reports having had one bowel movement since surgery but appetite is improving. Currently POD#4.    MEDICATIONS  (STANDING):  atorvastatin 10 milliGRAM(s) Oral at bedtime  bisacodyl 5 milliGRAM(s) Oral at bedtime  dextrose 5%. 1000 milliLiter(s) (50 mL/Hr) IV Continuous <Continuous>  dextrose 50% Injectable 12.5 Gram(s) IV Push once  dextrose 50% Injectable 25 Gram(s) IV Push once  dextrose 50% Injectable 25 Gram(s) IV Push once  enoxaparin Injectable 40 milliGRAM(s) SubCutaneous <User Schedule>  hydrocortisone 5 milliGRAM(s) Oral daily  influenza   Vaccine 0.5 milliLiter(s) IntraMuscular once  insulin lispro (ADMELOG) corrective regimen sliding scale   SubCutaneous three times a day before meals  insulin lispro (ADMELOG) corrective regimen sliding scale   SubCutaneous at bedtime  levothyroxine 75 MICROGram(s) Oral daily  losartan 50 milliGRAM(s) Oral daily  metoprolol succinate ER 50 milliGRAM(s) Oral daily  senna 2 Tablet(s) Oral at bedtime  sodium chloride 2 Gram(s) Oral two times a day  sodium chloride 0.65% Nasal 2 Spray(s) Both Nostrils four times a day    MEDICATIONS  (PRN):  dextrose 40% Gel 15 Gram(s) Oral once PRN Blood Glucose LESS THAN 70 milliGRAM(s)/deciliter  glucagon  Injectable 1 milliGRAM(s) IntraMuscular once PRN Glucose LESS THAN 70 milligrams/deciliter  guaiFENesin   Syrup  (Sugar-Free) 100 milliGRAM(s) Oral every 6 hours PRN Cough  ondansetron Injectable 4 milliGRAM(s) IV Push every 6 hours PRN Nausea and/or Vomiting  oxyCODONE    IR 5 milliGRAM(s) Oral every 4 hours PRN Moderate Pain (4 - 6)  oxyCODONE    IR 10 milliGRAM(s) Oral every 4 hours PRN Severe Pain (7 - 10)      PHYSICAL EXAM:  VITALS: T(C): 36.6 (11-02-20 @ 12:16)  T(F): 97.9 (11-02-20 @ 12:16), Max: 99 (11-01-20 @ 20:42)  HR: 95 (11-02-20 @ 12:16) (66 - 95)  BP: 119/72 (11-02-20 @ 12:16) (119/72 - 169/84)  RR:  (18 - 20)  SpO2:  (94% - 97%)  Wt(kg): --  GENERAL: NAD, well-groomed, well-developed  EYES: No proptosis, no injection  HEENT:  Atraumatic, Normocephalic, moist mucous membranes; nasal dressing and packing with clots evident  THYROID: Normal size, no palpable nodules  RESPIRATORY: Clear to auscultation bilaterally; No rales, rhonchi, wheezing, or rubs  CARDIOVASCULAR: Regular rate and rhythm; No murmurs; no peripheral edema  GI: Soft, nontender, non distended, normal bowel sounds  CUSHING'S SIGNS: no striae    POCT Blood Glucose.: 205 mg/dL (11-02-20 @ 12:35)  POCT Blood Glucose.: 127 mg/dL (11-02-20 @ 08:40)  POCT Blood Glucose.: 121 mg/dL (11-01-20 @ 20:58)  POCT Blood Glucose.: 120 mg/dL (11-01-20 @ 17:28)  POCT Blood Glucose.: 108 mg/dL (11-01-20 @ 13:02)  POCT Blood Glucose.: 102 mg/dL (11-01-20 @ 08:36)  POCT Blood Glucose.: 112 mg/dL (10-31-20 @ 21:30)  POCT Blood Glucose.: 109 mg/dL (10-31-20 @ 17:24)  POCT Blood Glucose.: 117 mg/dL (10-31-20 @ 12:45)  POCT Blood Glucose.: 110 mg/dL (10-31-20 @ 08:47)  POCT Blood Glucose.: 126 mg/dL (10-30-20 @ 21:01)  POCT Blood Glucose.: 126 mg/dL (10-30-20 @ 17:15)    11-02    128<L>  |  92<L>  |  15  ----------------------------<  114<H>  3.8   |  17<L>  |  0.69    EGFR if : 129  EGFR if non : 111    Ca    9.2      11-02  Mg     2.0     11-01  Phos  2.0     11-01            Thyroid Function Tests:  11-01 @ 12:02 TSH 0.48 FreeT4 0.6 T3 47                     Chief Complaint/Follow-up on: S/p Pitutary Macroadenoma resection    Subjective:  Patient seen and examined at bedside; patient's wife present during exam. No acute events overnight. Patient reports feeling better today; no new complaints and sinus congestion is improving. He states that his mouth remains somewhat dry as he is breathing through his mouth due to the nasal packing/dressing. However, he denies polydipsia, polyuria, increased fatigue, confusion, dizziness, headache. Reports having had one bowel movement since surgery but appetite is improving. Currently POD#4.    MEDICATIONS  (STANDING):  atorvastatin 10 milliGRAM(s) Oral at bedtime  bisacodyl 5 milliGRAM(s) Oral at bedtime  dextrose 5%. 1000 milliLiter(s) (50 mL/Hr) IV Continuous <Continuous>  dextrose 50% Injectable 12.5 Gram(s) IV Push once  dextrose 50% Injectable 25 Gram(s) IV Push once  dextrose 50% Injectable 25 Gram(s) IV Push once  enoxaparin Injectable 40 milliGRAM(s) SubCutaneous <User Schedule>  hydrocortisone 5 milliGRAM(s) Oral daily  influenza   Vaccine 0.5 milliLiter(s) IntraMuscular once  insulin lispro (ADMELOG) corrective regimen sliding scale   SubCutaneous three times a day before meals  insulin lispro (ADMELOG) corrective regimen sliding scale   SubCutaneous at bedtime  levothyroxine 75 MICROGram(s) Oral daily  losartan 50 milliGRAM(s) Oral daily  metoprolol succinate ER 50 milliGRAM(s) Oral daily  senna 2 Tablet(s) Oral at bedtime  sodium chloride 2 Gram(s) Oral two times a day  sodium chloride 0.65% Nasal 2 Spray(s) Both Nostrils four times a day    MEDICATIONS  (PRN):  dextrose 40% Gel 15 Gram(s) Oral once PRN Blood Glucose LESS THAN 70 milliGRAM(s)/deciliter  glucagon  Injectable 1 milliGRAM(s) IntraMuscular once PRN Glucose LESS THAN 70 milligrams/deciliter  guaiFENesin   Syrup  (Sugar-Free) 100 milliGRAM(s) Oral every 6 hours PRN Cough  ondansetron Injectable 4 milliGRAM(s) IV Push every 6 hours PRN Nausea and/or Vomiting  oxyCODONE    IR 5 milliGRAM(s) Oral every 4 hours PRN Moderate Pain (4 - 6)  oxyCODONE    IR 10 milliGRAM(s) Oral every 4 hours PRN Severe Pain (7 - 10)      PHYSICAL EXAM:  VITALS: T(C): 36.6 (11-02-20 @ 12:16)  T(F): 97.9 (11-02-20 @ 12:16), Max: 99 (11-01-20 @ 20:42)  HR: 95 (11-02-20 @ 12:16) (66 - 95)  BP: 119/72 (11-02-20 @ 12:16) (119/72 - 169/84)  RR:  (18 - 20)  SpO2:  (94% - 97%)  Wt(kg): --  GENERAL: NAD, well-groomed, well-developed  EYES: No proptosis, no injection  HEENT:  Atraumatic, Normocephalic, moist mucous membranes; nasal dressing and packing with clots evident  RESPIRATORY: Clear to auscultation bilaterally; No rales, rhonchi, wheezing, or rubs  CARDIOVASCULAR: Regular rate and rhythm; No murmurs; no peripheral edema  GI: Soft, nontender, non distended, normal bowel sounds      POCT Blood Glucose.: 205 mg/dL (11-02-20 @ 12:35)  POCT Blood Glucose.: 127 mg/dL (11-02-20 @ 08:40)  POCT Blood Glucose.: 121 mg/dL (11-01-20 @ 20:58)  POCT Blood Glucose.: 120 mg/dL (11-01-20 @ 17:28)  POCT Blood Glucose.: 108 mg/dL (11-01-20 @ 13:02)  POCT Blood Glucose.: 102 mg/dL (11-01-20 @ 08:36)  POCT Blood Glucose.: 112 mg/dL (10-31-20 @ 21:30)  POCT Blood Glucose.: 109 mg/dL (10-31-20 @ 17:24)  POCT Blood Glucose.: 117 mg/dL (10-31-20 @ 12:45)  POCT Blood Glucose.: 110 mg/dL (10-31-20 @ 08:47)  POCT Blood Glucose.: 126 mg/dL (10-30-20 @ 21:01)  POCT Blood Glucose.: 126 mg/dL (10-30-20 @ 17:15)    11-02    128<L>  |  92<L>  |  15  ----------------------------<  114<H>  3.8   |  17<L>  |  0.69    EGFR if : 129  EGFR if non : 111    Ca    9.2      11-02  Mg     2.0     11-01  Phos  2.0     11-01            Thyroid Function Tests:  11-01 @ 12:02 TSH 0.48 FreeT4 0.6 T3 47

## 2020-11-02 NOTE — PROVIDER CONTACT NOTE (OTHER) - ACTION/TREATMENT ORDERED:
MD Joseph aware. will continue to monitor
No other interventions at this time, continue to monitor.
As per PA no intervention at this time, apply clean dressing and continue to monitor.
Continue to monitor UO hourly and keep gilbert in place for DI watch; to notify provider of any further changes
ENT placed nasapor (foam) in left nostril, noted to be normal, continue to monitor.

## 2020-11-02 NOTE — PROGRESS NOTE ADULT - SUBJECTIVE AND OBJECTIVE BOX
Dr. Alfred Proper to room to discuss results. ENT ISSUE/POD: TSRP with left nasoseptal flap for small CSF leak POD#4      HPI: 48 y/o male with h/o HTN, T2DN, Hyperlipidemia c/o pituitary macroadenoma found upon workup for a low testosterone level, denies headaches or difficulties with his vision. Today he presents  for scheduled TSRP with left nasoseptal flap for small CSF leak  POD#4. Pt. was seen post op for small amount of epistaxis from left nare. Pt. was packed in the OR with nasopore but continued to bleed from left nare intermittently controlled with a small amount of nasopore. Pt. denies salty taste in mouth , headaches, N/V.          PAST MEDICAL & SURGICAL HISTORY:  Pituitary macroadenoma    History of high cholesterol    HTN (hypertension)    T2DM (type 2 diabetes mellitus)    No significant past surgical history      Allergies    No Known Allergies    Intolerances      MEDICATIONS  (STANDING):  atorvastatin 10 milliGRAM(s) Oral at bedtime  bisacodyl 5 milliGRAM(s) Oral at bedtime  dextrose 5%. 1000 milliLiter(s) (50 mL/Hr) IV Continuous <Continuous>  dextrose 50% Injectable 12.5 Gram(s) IV Push once  dextrose 50% Injectable 25 Gram(s) IV Push once  dextrose 50% Injectable 25 Gram(s) IV Push once  enoxaparin Injectable 40 milliGRAM(s) SubCutaneous <User Schedule>  hydrocortisone 5 milliGRAM(s) Oral <User Schedule>  hydrocortisone 10 milliGRAM(s) Oral <User Schedule>  influenza   Vaccine 0.5 milliLiter(s) IntraMuscular once  insulin lispro (ADMELOG) corrective regimen sliding scale   SubCutaneous three times a day before meals  insulin lispro (ADMELOG) corrective regimen sliding scale   SubCutaneous at bedtime  levothyroxine 75 MICROGram(s) Oral daily  losartan 50 milliGRAM(s) Oral daily  metoprolol succinate ER 50 milliGRAM(s) Oral daily  senna 2 Tablet(s) Oral at bedtime  sodium chloride 2 Gram(s) Oral two times a day  sodium chloride 0.65% Nasal 2 Spray(s) Both Nostrils four times a day    MEDICATIONS  (PRN):  dextrose 40% Gel 15 Gram(s) Oral once PRN Blood Glucose LESS THAN 70 milliGRAM(s)/deciliter  glucagon  Injectable 1 milliGRAM(s) IntraMuscular once PRN Glucose LESS THAN 70 milligrams/deciliter  guaiFENesin   Syrup  (Sugar-Free) 100 milliGRAM(s) Oral every 6 hours PRN Cough  ondansetron Injectable 4 milliGRAM(s) IV Push every 6 hours PRN Nausea and/or Vomiting  oxyCODONE    IR 5 milliGRAM(s) Oral every 4 hours PRN Moderate Pain (4 - 6)  oxyCODONE    IR 10 milliGRAM(s) Oral every 4 hours PRN Severe Pain (7 - 10)      Social History: see consult    Family history: see consult    ROS:   ENT: all negative except as noted in HPI   Pulm: denies SOB, cough, hemoptysis  Neuro: denies numbness/tingling, loss of sensation  Endo: denies heat/cold intolerance, excessive sweating      Vital Signs Last 24 Hrs  T(C): 36.5 (02 Nov 2020 04:42), Max: 37.2 (01 Nov 2020 20:42)  T(F): 97.7 (02 Nov 2020 04:42), Max: 99 (01 Nov 2020 20:42)  HR: 89 (02 Nov 2020 04:42) (57 - 89)  BP: 154/97 (02 Nov 2020 04:42) (145/88 - 169/84)  BP(mean): --  RR: 18 (02 Nov 2020 04:42) (18 - 20)  SpO2: 96% (02 Nov 2020 04:42) (94% - 96%)     11-02    128<L>  |  92<L>  |  15  ----------------------------<  114<H>  3.8   |  17<L>  |  0.69    Ca    9.2      02 Nov 2020 05:52  Phos  2.0     11-01  Mg     2.0     11-01         PHYSICAL EXAM:  Gen: NAD  Skin: No rashes, bruises, or lesions  Head: Normocephalic, Atraumatic  Face: no edema, erythema, or fluctuance. Parotid glands soft without mass  Eyes: no scleral injection  Nose: Right: nasopore placed in OR, unable to visualize  packing,, no active bleeding            Left: small amt. of bleeding noted, nasopore was added anteriorly to control bleeding, mustache dressing applied  Mouth: No Stridor / Drooling / Trismus.  Mucosa moist, tongue/uvula midline, oropharynx clear with no blood in oropharynx  Neck: Flat, supple, no lymphadenopathy, trachea midline, no masses  Lymphatic: No lymphadenopathy  Resp: breathing easily, no stridor  Neuro: facial nerve intact, no facial droop

## 2020-11-02 NOTE — PROGRESS NOTE ADULT - ASSESSMENT
48 y/o male with h/o HTN, T2DN, Hyperlipidemia found to have pituitary macroadenoma following outpatient workup for low Testosterone now s/p endoscopic TSP resection c/b CSF leak repair w/ nasal septal flap+ duragen closure on 10/29/2020.      PLAN:  Neuro:   - f/u pathology  - no sign of rhinorrhea or salty taste  - post op MRI on 10/30 revealed the residual soft tissue in the sella and suprasellar region appears of similar size with only minimal peripheral enhancement consistent with postoperative resection without interval loss of height. There is no change in mild mass effect on the optic chiasm. No acute infarcts. Normal intracranial enhancement  - pain management with tylenol and oxycodone prn  - TSP/skull base precautions: no straws, nasal instrumentation BiPAP, no incentive spirometer  - DI watch   - watch for CSF leak   - continue nasal saline spray   Respiratory:   - on room air  CV:  - HTN- continue losartan and metoprolol  - on statin  Endocrine:        -secondary AI-  start physiologic dose of steroid replacement, hydrocortisone 10 mg at 8 am and 5 mg at 3 pm  - secondary hypothyroidism start levothyroxine 75 mcg daily.  - Recheck HPA axis as outpatient and follow up with outpatient endocrinologist.  -Type 2 diabetes mellitus without complication, without long-term current use of insulin.  Well controlled, A1c 7% 10/2020, on Metformin and Jardiance as outpatient  - continue with admelog correctional scale qac and qhs as inpatient, FS at goal 100-180.  DVT ppx:   - venodynes, sq lovenox  - LE dopp- neg for dvt  PT/OT:   - no PT needs      Spectralink # [id number]

## 2020-11-02 NOTE — CHART NOTE - NSCHARTNOTEFT_GEN_A_CORE
Serum sodium 127, Dr Bernal made aware. Will give hydrocortisone 100 mg x1 as per Dr Bernal. Will recheck Na in 6 hours. Endocrine to follow up with patient this afternoon.

## 2020-11-02 NOTE — PROGRESS NOTE ADULT - SUBJECTIVE AND OBJECTIVE BOX
Patient is a 49y old  Male who presents with a chief complaint of admitted after TSP (31 Oct 2020 14:16)      SUBJECTIVE / OVERNIGHT EVENTS:    Patient seen and examined. no ha dizziness. still drainage from nares, bloody.       Vital Signs Last 24 Hrs  T(C): 36.6 (02 Nov 2020 12:16), Max: 37.2 (01 Nov 2020 20:42)  T(F): 97.9 (02 Nov 2020 12:16), Max: 99 (01 Nov 2020 20:42)  HR: 95 (02 Nov 2020 12:16) (66 - 95)  BP: 119/72 (02 Nov 2020 12:16) (119/72 - 169/84)  BP(mean): --  RR: 20 (02 Nov 2020 12:16) (18 - 20)  SpO2: 97% (02 Nov 2020 12:16) (94% - 97%)  I&O's Summary    01 Nov 2020 07:01  -  02 Nov 2020 07:00  --------------------------------------------------------  IN: 1200 mL / OUT: 3075 mL / NET: -1875 mL    02 Nov 2020 07:01  -  02 Nov 2020 12:30  --------------------------------------------------------  IN: 0 mL / OUT: 400 mL / NET: -400 mL        PE:  GENERAL: NAD, Comfortable, nasal packing and dressing  HEAD:  Atraumatic, Normocephalic  CHEST/LUNG: Clear to auscultation bilaterally; No wheeze  HEART: Regular rate and rhythm; No murmurs, rubs, or gallops  ABDOMEN: Soft, Nontender, Nondistended; Bowel sounds present  Neuro: AAOx3, no focal deficit, 5/5 b/l extremities  EXTREMITIES:  2+ Peripheral Pulses, No clubbing, cyanosis, or edema  SKIN: No rashes or lesions      LABS:    11-02    128<L>  |  92<L>  |  15  ----------------------------<  114<H>  3.8   |  17<L>  |  0.69    Ca    9.2      02 Nov 2020 05:52  Phos  2.0     11-01  Mg     2.0     11-01        CAPILLARY BLOOD GLUCOSE      POCT Blood Glucose.: 127 mg/dL (02 Nov 2020 08:40)  POCT Blood Glucose.: 121 mg/dL (01 Nov 2020 20:58)  POCT Blood Glucose.: 120 mg/dL (01 Nov 2020 17:28)  POCT Blood Glucose.: 108 mg/dL (01 Nov 2020 13:02)            RADIOLOGY & ADDITIONAL TESTS:    Imaging Personally Reviewed:  [x] YES  [ ] NO    Consultant(s) Notes Reviewed:  [x] YES  [ ] NO    MEDICATIONS  (STANDING):  atorvastatin 10 milliGRAM(s) Oral at bedtime  bisacodyl 5 milliGRAM(s) Oral at bedtime  dextrose 5%. 1000 milliLiter(s) (50 mL/Hr) IV Continuous <Continuous>  dextrose 50% Injectable 25 Gram(s) IV Push once  dextrose 50% Injectable 12.5 Gram(s) IV Push once  dextrose 50% Injectable 25 Gram(s) IV Push once  enoxaparin Injectable 40 milliGRAM(s) SubCutaneous <User Schedule>  hydrocortisone 5 milliGRAM(s) Oral daily  influenza   Vaccine 0.5 milliLiter(s) IntraMuscular once  insulin lispro (ADMELOG) corrective regimen sliding scale   SubCutaneous three times a day before meals  insulin lispro (ADMELOG) corrective regimen sliding scale   SubCutaneous at bedtime  levothyroxine 75 MICROGram(s) Oral daily  losartan 50 milliGRAM(s) Oral daily  metoprolol succinate ER 50 milliGRAM(s) Oral daily  senna 2 Tablet(s) Oral at bedtime  sodium chloride 2 Gram(s) Oral two times a day  sodium chloride 0.65% Nasal 2 Spray(s) Both Nostrils four times a day    MEDICATIONS  (PRN):  dextrose 40% Gel 15 Gram(s) Oral once PRN Blood Glucose LESS THAN 70 milliGRAM(s)/deciliter  glucagon  Injectable 1 milliGRAM(s) IntraMuscular once PRN Glucose LESS THAN 70 milligrams/deciliter  guaiFENesin   Syrup  (Sugar-Free) 100 milliGRAM(s) Oral every 6 hours PRN Cough  ondansetron Injectable 4 milliGRAM(s) IV Push every 6 hours PRN Nausea and/or Vomiting  oxyCODONE    IR 5 milliGRAM(s) Oral every 4 hours PRN Moderate Pain (4 - 6)  oxyCODONE    IR 10 milliGRAM(s) Oral every 4 hours PRN Severe Pain (7 - 10)      Care Discussed with Consultants/Other Providers [x] YES  [ ] NO    HEALTH ISSUES - PROBLEM Dx:  Hypogonadotropic hypogonadism  Hypogonadotropic hypogonadism    Secondary hypothyroidism  Secondary hypothyroidism    Secondary adrenal insufficiency  Secondary adrenal insufficiency    Hyperlipidemia, unspecified hyperlipidemia type  Hyperlipidemia, unspecified hyperlipidemia type    Essential hypertension  Essential hypertension    Type 2 diabetes mellitus without complication, without long-term current use of insulin  Type 2 diabetes mellitus without complication, without long-term current use of insulin    Need for prophylactic measure    T2DM (type 2 diabetes mellitus)    HTN (hypertension)    Pituitary macroadenoma

## 2020-11-02 NOTE — PROVIDER CONTACT NOTE (OTHER) - SITUATION
BMP ordered for 2200, resulted with Na 126. Pt is on salt tabs 2g BID. Diet was recently changed to 1.5 Liter fluid restrict.

## 2020-11-02 NOTE — PROGRESS NOTE ADULT - PROBLEM SELECTOR PLAN 6
- BP above goal <140/90 on current anti-HTN medications, adjustment as per primary team. - Goal LDL <70, on statin, monitor lipid profile routinely. -Can consider Fluid restrict 1-1.5L for c/f SIADH with Na 127

## 2020-11-02 NOTE — PROGRESS NOTE ADULT - ASSESSMENT
50 y/o male with h/o HTN, T2DM, Hyperlipidemia, presented with c/o pituitary macroadenoma found upon workup for a low testosterone level, now s/p resection on 11/29/20. Endocrine team consulted for post op evaluation.          ****A/P not final until cosigned by attending Dr. Monsalve**** 50 y/o male with h/o HTN, T2DM controlled (HbA1c 7%), Hyperlipidemia, presented with c/o pituitary macroadenoma found upon workup for a low testosterone level, now s/p resection on 11/29/20. Endocrine team consulted for post op evaluation.

## 2020-11-02 NOTE — PROGRESS NOTE ADULT - ASSESSMENT
48 y/o male with h/o HTN, T2DN, Hyperlipidemia c/o pituitary macroadenoma found upon workup for a low testosterone level, denies headaches or difficulties with his vision. Today he presents  for scheduled Endoscopic Transphenoidal Removal Pituitary Tumor.  Post-op pt . The bleeding was reinforced with nasopore and the bleeding was controlled.

## 2020-11-02 NOTE — PROGRESS NOTE ADULT - SUBJECTIVE AND OBJECTIVE BOX
SUBJECTIVE: Pt seen and examined, Na 128 this morning, will monitor, no rhinorrhea or salty taste    OVERNIGHT EVENTS: none    Vital Signs Last 24 Hrs  T(C): 36.6 (02 Nov 2020 12:16), Max: 37.2 (01 Nov 2020 20:42)  T(F): 97.9 (02 Nov 2020 12:16), Max: 99 (01 Nov 2020 20:42)  HR: 95 (02 Nov 2020 12:16) (66 - 95)  BP: 119/72 (02 Nov 2020 12:16) (119/72 - 169/84)  BP(mean): --  RR: 20 (02 Nov 2020 12:16) (18 - 20)  SpO2: 97% (02 Nov 2020 12:16) (94% - 97%)    PHYSICAL EXAM:    General: No Acute Distress     Neurological: Awake, alert oriented to person, place and time, Following Commands, PERRL, EOMI, Face Symmetrical, Speech Fluent, Moving all extremities, Muscle Strength normal in all four extremities, No Drift, Sensation to Light Touch Intact, scant serosanguinous drainage from nose, moustache dressing in place    Pulmonary: Clear to Auscultation, No Rales, No Rhonchi, No Wheezes     Cardiovascular: S1, S2, Regular Rate and Rhythm     Gastrointestinal: Soft, Nontender, Nondistended     Incision: none    LABS:   11-02    128<L>  |  92<L>  |  15  ----------------------------<  114<H>  3.8   |  17<L>  |  0.69    Ca    9.2      02 Nov 2020 05:52  Phos  2.0     11-01  Mg     2.0     11-01 11-01 @ 07:01  -  11-02 @ 07:00  --------------------------------------------------------  IN: 1200 mL / OUT: 3075 mL / NET: -1875 mL    11-02 @ 07:01  -  11-02 @ 12:40  --------------------------------------------------------  IN: 0 mL / OUT: 400 mL / NET: -400 mL        MEDICATIONS:  Antibiotics:    Neuro:  ondansetron Injectable 4 milliGRAM(s) IV Push every 6 hours PRN Nausea and/or Vomiting  oxyCODONE    IR 5 milliGRAM(s) Oral every 4 hours PRN Moderate Pain (4 - 6)  oxyCODONE    IR 10 milliGRAM(s) Oral every 4 hours PRN Severe Pain (7 - 10)    Cardiac:  losartan 50 milliGRAM(s) Oral daily  metoprolol succinate ER 50 milliGRAM(s) Oral daily    Pulm:  guaiFENesin   Syrup  (Sugar-Free) 100 milliGRAM(s) Oral every 6 hours PRN Cough    GI/:  bisacodyl 5 milliGRAM(s) Oral at bedtime  senna 2 Tablet(s) Oral at bedtime    Other:   atorvastatin 10 milliGRAM(s) Oral at bedtime  dextrose 40% Gel 15 Gram(s) Oral once PRN Blood Glucose LESS THAN 70 milliGRAM(s)/deciliter  dextrose 5%. 1000 milliLiter(s) IV Continuous <Continuous>  dextrose 50% Injectable 12.5 Gram(s) IV Push once  dextrose 50% Injectable 25 Gram(s) IV Push once  dextrose 50% Injectable 25 Gram(s) IV Push once  enoxaparin Injectable 40 milliGRAM(s) SubCutaneous <User Schedule>  glucagon  Injectable 1 milliGRAM(s) IntraMuscular once PRN Glucose LESS THAN 70 milligrams/deciliter  hydrocortisone 5 milliGRAM(s) Oral daily  influenza   Vaccine 0.5 milliLiter(s) IntraMuscular once  insulin lispro (ADMELOG) corrective regimen sliding scale   SubCutaneous three times a day before meals  insulin lispro (ADMELOG) corrective regimen sliding scale   SubCutaneous at bedtime  levothyroxine 75 MICROGram(s) Oral daily  sodium chloride 2 Gram(s) Oral two times a day  sodium chloride 0.65% Nasal 2 Spray(s) Both Nostrils four times a day    DIET: [x] Regular [] CCD [] Renal [] Puree [] Dysphagia [] Tube Feeds:     IMAGING:   < from: MR Head w/wo IV Cont (10.30.20 @ 14:05) >  IMPRESSION: Since the prior examination of 10/20/2020 there has been transsphenoidal resection of the large pituitary macroadenoma. The residual soft tissue in the sella and suprasellar region appears of similar size with only minimal peripheral enhancement consistent with postoperative resection without interval loss of height. There is no change in mild mass effect on the optic chiasm.    < end of copied text >  < from: [facility] Duplex Lower Ext Vein Scan, Bilat (10.29.20 @ 16:15) >  IMPRESSION:  No evidence of deep venous thrombosis in either lower extremity.    < end of copied text >

## 2020-11-02 NOTE — PROGRESS NOTE ADULT - PROBLEM SELECTOR PLAN 1
Pre op hormonal testing only significant for low testosterone level as per patient. No reports available. Now s/p resection on 11/29/20.   Per team, patient did receive steroids intraoperatively.  Cortisol level checked on POD # 1 was 27.4 and on POD # 2 was 4.1, POD # 3 was 2.3  Patient is currently hemodynamically stable  - Repeat cortisol this am again noted to be low 2.3, no signs or symptoms of AI at this time but will recommend to start physiologic dose of steroid replacement, hydrocortisone 10 mg at 8 am and 5 mg at 3 pm  - TFTs consistent with secondary hypothyroidism so start levothyroxine 75 mcg qdaily.   - Monitor for DI: strict I/Os, BMP qdaily, if urine output more than 500ml/h or 250ml/h for 2 consecutive hours get stat Na and urine osmolality/specific gravity. If Na is >145 and urine osmolality is <300, can give a dose of DDAVP PO 0.05 mg x 1. Urine output noted high this am but Na is 131, consider checking urine osmolality.   - Follow up surgical pathology report  - Recheck HPA axis and TFTs as outpatient in a month or so along with other pituitary hormones including testosterone, prolactin, IGF-1.   - Visual field testing and endocrine follow up as outpatient. He will follow with his outpatient endocrinologist Dr. Deejay Kinney Pre op hormonal testing only significant for low testosterone level as per patient. No reports available. Now s/p resection on 11/29/20.   Per team, patient did receive steroids intraoperatively.  Cortisol level checked on POD # 1 was 27.4 and on POD # 2 was 4.1, POD # 3 was 2.3  Patient is currently hemodynamically stable  - Repeat cortisol this am again noted to be low 2.3, no signs or symptoms of AI at this time but will recommend to start physiologic dose of steroid replacement, hydrocortisone 10 mg at 8 am and 5 mg at 3 pm  - TFTs consistent with secondary hypothyroidism; decrease levothyroxine to 50 mcg daily  - Monitor for DI: strict I/Os, BMP qdaily, if urine output more than 500ml/h or 250ml/h for 2 consecutive hours get stat Na and urine osmolality/specific gravity. If Na is >145 and urine osmolality is <300, can give a dose of DDAVP PO 0.05 mg x 1. Urine output noted high this am but Na is 131, consider checking urine osmolality.   -Can consider Fluid restrict 1-1.5L for c/f SIADH with Na 127  - Follow up surgical pathology report  - Recheck HPA axis and TFTs as outpatient in a month or so along with other pituitary hormones including testosterone, prolactin, IGF-1.   - Visual field testing and endocrine follow up as outpatient. He will follow with his outpatient endocrinologist Dr. Deejay Kinney Pre op hormonal testing only significant for low testosterone level as per patient. No reports available. Now s/p resection on 11/29/20.   - Follow up surgical pathology report  - Recheck HPA axis and TFTs as outpatient in a month or so along with other pituitary hormones including testosterone, prolactin, IGF-1.   - Visual field testing and endocrine follow up as outpatient. He will follow with his outpatient endocrinologist Dr. Deejay Kinney

## 2020-11-03 LAB
ANION GAP SERPL CALC-SCNC: 15 MMOL/L — SIGNIFICANT CHANGE UP (ref 5–17)
BUN SERPL-MCNC: 20 MG/DL — SIGNIFICANT CHANGE UP (ref 7–23)
CALCIUM SERPL-MCNC: 9.7 MG/DL — SIGNIFICANT CHANGE UP (ref 8.4–10.5)
CHLORIDE SERPL-SCNC: 90 MMOL/L — LOW (ref 96–108)
CO2 SERPL-SCNC: 23 MMOL/L — SIGNIFICANT CHANGE UP (ref 22–31)
CREAT SERPL-MCNC: 0.8 MG/DL — SIGNIFICANT CHANGE UP (ref 0.5–1.3)
GLUCOSE BLDC GLUCOMTR-MCNC: 126 MG/DL — HIGH (ref 70–99)
GLUCOSE BLDC GLUCOMTR-MCNC: 156 MG/DL — HIGH (ref 70–99)
GLUCOSE BLDC GLUCOMTR-MCNC: 175 MG/DL — HIGH (ref 70–99)
GLUCOSE BLDC GLUCOMTR-MCNC: 199 MG/DL — HIGH (ref 70–99)
GLUCOSE SERPL-MCNC: 228 MG/DL — HIGH (ref 70–99)
POTASSIUM SERPL-MCNC: 3.4 MMOL/L — LOW (ref 3.5–5.3)
POTASSIUM SERPL-SCNC: 3.4 MMOL/L — LOW (ref 3.5–5.3)
SODIUM SERPL-SCNC: 128 MMOL/L — LOW (ref 135–145)

## 2020-11-03 PROCEDURE — 99024 POSTOP FOLLOW-UP VISIT: CPT

## 2020-11-03 PROCEDURE — 99232 SBSQ HOSP IP/OBS MODERATE 35: CPT | Mod: GC

## 2020-11-03 RX ORDER — POTASSIUM CHLORIDE 20 MEQ
40 PACKET (EA) ORAL ONCE
Refills: 0 | Status: COMPLETED | OUTPATIENT
Start: 2020-11-03 | End: 2020-11-03

## 2020-11-03 RX ORDER — SODIUM CHLORIDE 9 MG/ML
2 INJECTION INTRAMUSCULAR; INTRAVENOUS; SUBCUTANEOUS EVERY 8 HOURS
Refills: 0 | Status: DISCONTINUED | OUTPATIENT
Start: 2020-11-03 | End: 2020-11-04

## 2020-11-03 RX ADMIN — Medication 5 MILLIGRAM(S): at 16:31

## 2020-11-03 RX ADMIN — Medication 2: at 17:40

## 2020-11-03 RX ADMIN — Medication 40 MILLIEQUIVALENT(S): at 18:21

## 2020-11-03 RX ADMIN — SODIUM CHLORIDE 2 GRAM(S): 9 INJECTION INTRAMUSCULAR; INTRAVENOUS; SUBCUTANEOUS at 13:20

## 2020-11-03 RX ADMIN — SENNA PLUS 2 TABLET(S): 8.6 TABLET ORAL at 21:30

## 2020-11-03 RX ADMIN — SODIUM CHLORIDE 2 GRAM(S): 9 INJECTION INTRAMUSCULAR; INTRAVENOUS; SUBCUTANEOUS at 05:06

## 2020-11-03 RX ADMIN — Medication 2 SPRAY(S): at 13:20

## 2020-11-03 RX ADMIN — Medication 50 MILLIGRAM(S): at 05:06

## 2020-11-03 RX ADMIN — OXYCODONE HYDROCHLORIDE 10 MILLIGRAM(S): 5 TABLET ORAL at 23:08

## 2020-11-03 RX ADMIN — ENOXAPARIN SODIUM 40 MILLIGRAM(S): 100 INJECTION SUBCUTANEOUS at 17:40

## 2020-11-03 RX ADMIN — Medication 2 SPRAY(S): at 17:40

## 2020-11-03 RX ADMIN — Medication 2: at 13:20

## 2020-11-03 RX ADMIN — ATORVASTATIN CALCIUM 10 MILLIGRAM(S): 80 TABLET, FILM COATED ORAL at 21:30

## 2020-11-03 RX ADMIN — Medication 50 MICROGRAM(S): at 06:05

## 2020-11-03 RX ADMIN — Medication 10 MILLIGRAM(S): at 08:41

## 2020-11-03 RX ADMIN — Medication 2 SPRAY(S): at 05:07

## 2020-11-03 RX ADMIN — SODIUM CHLORIDE 2 GRAM(S): 9 INJECTION INTRAMUSCULAR; INTRAVENOUS; SUBCUTANEOUS at 21:30

## 2020-11-03 RX ADMIN — LOSARTAN POTASSIUM 50 MILLIGRAM(S): 100 TABLET, FILM COATED ORAL at 05:06

## 2020-11-03 RX ADMIN — Medication 2 SPRAY(S): at 23:09

## 2020-11-03 RX ADMIN — Medication 5 MILLIGRAM(S): at 21:30

## 2020-11-03 NOTE — PROGRESS NOTE ADULT - SUBJECTIVE AND OBJECTIVE BOX
Chief Complaint/Follow-up on:     Subjective:    MEDICATIONS  (STANDING):  atorvastatin 10 milliGRAM(s) Oral at bedtime  bisacodyl 5 milliGRAM(s) Oral at bedtime  dextrose 5%. 1000 milliLiter(s) (50 mL/Hr) IV Continuous <Continuous>  dextrose 50% Injectable 12.5 Gram(s) IV Push once  dextrose 50% Injectable 25 Gram(s) IV Push once  dextrose 50% Injectable 25 Gram(s) IV Push once  enoxaparin Injectable 40 milliGRAM(s) SubCutaneous <User Schedule>  hydrocortisone 5 milliGRAM(s) Oral <User Schedule>  hydrocortisone 10 milliGRAM(s) Oral <User Schedule>  influenza   Vaccine 0.5 milliLiter(s) IntraMuscular once  insulin lispro (ADMELOG) corrective regimen sliding scale   SubCutaneous three times a day before meals  insulin lispro (ADMELOG) corrective regimen sliding scale   SubCutaneous at bedtime  levothyroxine 50 MICROGram(s) Oral daily  losartan 50 milliGRAM(s) Oral daily  metoprolol succinate ER 50 milliGRAM(s) Oral daily  senna 2 Tablet(s) Oral at bedtime  sodium chloride 2 Gram(s) Oral every 8 hours  sodium chloride 0.65% Nasal 2 Spray(s) Both Nostrils four times a day    MEDICATIONS  (PRN):  dextrose 40% Gel 15 Gram(s) Oral once PRN Blood Glucose LESS THAN 70 milliGRAM(s)/deciliter  glucagon  Injectable 1 milliGRAM(s) IntraMuscular once PRN Glucose LESS THAN 70 milligrams/deciliter  guaiFENesin   Syrup  (Sugar-Free) 100 milliGRAM(s) Oral every 6 hours PRN Cough  ondansetron Injectable 4 milliGRAM(s) IV Push every 6 hours PRN Nausea and/or Vomiting  oxyCODONE    IR 5 milliGRAM(s) Oral every 4 hours PRN Moderate Pain (4 - 6)  oxyCODONE    IR 10 milliGRAM(s) Oral every 4 hours PRN Severe Pain (7 - 10)      PHYSICAL EXAM:  VITALS: T(C): 36.7 (11-03-20 @ 12:17)  T(F): 98.1 (11-03-20 @ 12:17), Max: 98.8 (11-03-20 @ 08:09)  HR: 72 (11-03-20 @ 12:17) (66 - 84)  BP: 111/70 (11-03-20 @ 12:17) (111/70 - 150/84)  RR:  (18 - 18)  SpO2:  (95% - 97%)  Wt(kg): --  GENERAL: NAD, well-groomed, well-developed  EYES: No proptosis, no injection  HEENT:  Atraumatic, Normocephalic, moist mucous membranes  THYROID: Normal size, no palpable nodules  RESPIRATORY: Clear to auscultation bilaterally; No rales, rhonchi, wheezing, or rubs  CARDIOVASCULAR: Regular rate and rhythm; No murmurs; no peripheral edema  GI: Soft, nontender, non distended, normal bowel sounds  CUSHING'S SIGNS: no striae    POCT Blood Glucose.: 126 mg/dL (11-03-20 @ 08:17)  POCT Blood Glucose.: 209 mg/dL (11-02-20 @ 20:57)  POCT Blood Glucose.: 128 mg/dL (11-02-20 @ 17:14)  POCT Blood Glucose.: 205 mg/dL (11-02-20 @ 12:35)  POCT Blood Glucose.: 127 mg/dL (11-02-20 @ 08:40)  POCT Blood Glucose.: 121 mg/dL (11-01-20 @ 20:58)  POCT Blood Glucose.: 120 mg/dL (11-01-20 @ 17:28)  POCT Blood Glucose.: 108 mg/dL (11-01-20 @ 13:02)  POCT Blood Glucose.: 102 mg/dL (11-01-20 @ 08:36)  POCT Blood Glucose.: 112 mg/dL (10-31-20 @ 21:30)  POCT Blood Glucose.: 109 mg/dL (10-31-20 @ 17:24)  POCT Blood Glucose.: 117 mg/dL (10-31-20 @ 12:45)    11-03    128<L>  |  90<L>  |  20  ----------------------------<  228<H>  3.4<L>   |  23  |  0.80    EGFR if : 122  EGFR if non : 105    Ca    9.7      11-03  Mg     2.0     11-01  Phos  2.0     11-01      Thyroid Function Tests:  11-01 @ 12:02 TSH 0.48 FreeT4 0.6    Chief Complaint/Follow-up on: S/p pituitary macroadenoma resection    Subjective:  Patient was seen and examined at bedside this morning. No acute events overnight. Currently POD#5 and patient reports feeling well, no new complaints. Worsening hyponatremia, though patient denies dizziness, weakness, unsteady gait, muscle cramps. No concern for seizure activity. Patient states that he is ambulating to bathroom and chair without issue. Discussed rationale behind salt tabs and fluid restriction, patient understands plan and is cooperating with fluid restriction.    MEDICATIONS  (STANDING):  atorvastatin 10 milliGRAM(s) Oral at bedtime  bisacodyl 5 milliGRAM(s) Oral at bedtime  dextrose 5%. 1000 milliLiter(s) (50 mL/Hr) IV Continuous <Continuous>  dextrose 50% Injectable 12.5 Gram(s) IV Push once  dextrose 50% Injectable 25 Gram(s) IV Push once  dextrose 50% Injectable 25 Gram(s) IV Push once  enoxaparin Injectable 40 milliGRAM(s) SubCutaneous <User Schedule>  hydrocortisone 5 milliGRAM(s) Oral <User Schedule>  hydrocortisone 10 milliGRAM(s) Oral <User Schedule>  influenza   Vaccine 0.5 milliLiter(s) IntraMuscular once  insulin lispro (ADMELOG) corrective regimen sliding scale   SubCutaneous three times a day before meals  insulin lispro (ADMELOG) corrective regimen sliding scale   SubCutaneous at bedtime  levothyroxine 50 MICROGram(s) Oral daily  losartan 50 milliGRAM(s) Oral daily  metoprolol succinate ER 50 milliGRAM(s) Oral daily  senna 2 Tablet(s) Oral at bedtime  sodium chloride 2 Gram(s) Oral every 8 hours  sodium chloride 0.65% Nasal 2 Spray(s) Both Nostrils four times a day    MEDICATIONS  (PRN):  dextrose 40% Gel 15 Gram(s) Oral once PRN Blood Glucose LESS THAN 70 milliGRAM(s)/deciliter  glucagon  Injectable 1 milliGRAM(s) IntraMuscular once PRN Glucose LESS THAN 70 milligrams/deciliter  guaiFENesin   Syrup  (Sugar-Free) 100 milliGRAM(s) Oral every 6 hours PRN Cough  ondansetron Injectable 4 milliGRAM(s) IV Push every 6 hours PRN Nausea and/or Vomiting  oxyCODONE    IR 5 milliGRAM(s) Oral every 4 hours PRN Moderate Pain (4 - 6)  oxyCODONE    IR 10 milliGRAM(s) Oral every 4 hours PRN Severe Pain (7 - 10)      PHYSICAL EXAM:  VITALS: T(C): 36.7 (11-03-20 @ 12:17)  T(F): 98.1 (11-03-20 @ 12:17), Max: 98.8 (11-03-20 @ 08:09)  HR: 72 (11-03-20 @ 12:17) (66 - 84)  BP: 111/70 (11-03-20 @ 12:17) (111/70 - 150/84)  RR:  (18 - 18)  SpO2:  (95% - 97%)  Wt(kg): --  GENERAL: NAD, well-groomed, well-developed  EYES: No proptosis, no injection  HEENT:  Atraumatic, Normocephalic, moist mucous membranes; nasal dressing with some dried blood evident improved from prior exam  THYROID: Normal size, no palpable nodules  RESPIRATORY: Clear to auscultation bilaterally; No rales, rhonchi, wheezing, or rubs  CARDIOVASCULAR: Regular rate and rhythm; No murmurs; no peripheral edema  GI: Soft, nontender, non distended, normal bowel sounds  CUSHING'S SIGNS: no striae    POCT Blood Glucose.: 126 mg/dL (11-03-20 @ 08:17)  POCT Blood Glucose.: 209 mg/dL (11-02-20 @ 20:57)  POCT Blood Glucose.: 128 mg/dL (11-02-20 @ 17:14)  POCT Blood Glucose.: 205 mg/dL (11-02-20 @ 12:35)  POCT Blood Glucose.: 127 mg/dL (11-02-20 @ 08:40)  POCT Blood Glucose.: 121 mg/dL (11-01-20 @ 20:58)  POCT Blood Glucose.: 120 mg/dL (11-01-20 @ 17:28)  POCT Blood Glucose.: 108 mg/dL (11-01-20 @ 13:02)  POCT Blood Glucose.: 102 mg/dL (11-01-20 @ 08:36)  POCT Blood Glucose.: 112 mg/dL (10-31-20 @ 21:30)  POCT Blood Glucose.: 109 mg/dL (10-31-20 @ 17:24)  POCT Blood Glucose.: 117 mg/dL (10-31-20 @ 12:45)    11-03    128<L>  |  90<L>  |  20  ----------------------------<  228<H>  3.4<L>   |  23  |  0.80    EGFR if : 122  EGFR if non : 105    Ca    9.7      11-03  Mg     2.0     11-01  Phos  2.0     11-01      Thyroid Function Tests:  11-01 @ 12:02 TSH 0.48 FreeT4 0.6    Chief Complaint/Follow-up on: S/p pituitary macroadenoma resection    Subjective:  Patient was seen and examined at bedside this morning. No acute events overnight. Currently POD#5 and patient reports feeling well, no new complaints. Worsening hyponatremia, though patient denies dizziness, weakness, unsteady gait, muscle cramps. No concern for seizure activity. Patient states that he is ambulating to bathroom and chair without issue. Discussed rationale behind salt tabs and fluid restriction, patient understands plan and is cooperating with fluid restriction.    MEDICATIONS  (STANDING):  atorvastatin 10 milliGRAM(s) Oral at bedtime  bisacodyl 5 milliGRAM(s) Oral at bedtime  dextrose 5%. 1000 milliLiter(s) (50 mL/Hr) IV Continuous <Continuous>  dextrose 50% Injectable 12.5 Gram(s) IV Push once  dextrose 50% Injectable 25 Gram(s) IV Push once  dextrose 50% Injectable 25 Gram(s) IV Push once  enoxaparin Injectable 40 milliGRAM(s) SubCutaneous <User Schedule>  hydrocortisone 5 milliGRAM(s) Oral <User Schedule>  hydrocortisone 10 milliGRAM(s) Oral <User Schedule>  influenza   Vaccine 0.5 milliLiter(s) IntraMuscular once  insulin lispro (ADMELOG) corrective regimen sliding scale   SubCutaneous three times a day before meals  insulin lispro (ADMELOG) corrective regimen sliding scale   SubCutaneous at bedtime  levothyroxine 50 MICROGram(s) Oral daily  losartan 50 milliGRAM(s) Oral daily  metoprolol succinate ER 50 milliGRAM(s) Oral daily  senna 2 Tablet(s) Oral at bedtime  sodium chloride 2 Gram(s) Oral every 8 hours  sodium chloride 0.65% Nasal 2 Spray(s) Both Nostrils four times a day    MEDICATIONS  (PRN):  dextrose 40% Gel 15 Gram(s) Oral once PRN Blood Glucose LESS THAN 70 milliGRAM(s)/deciliter  glucagon  Injectable 1 milliGRAM(s) IntraMuscular once PRN Glucose LESS THAN 70 milligrams/deciliter  guaiFENesin   Syrup  (Sugar-Free) 100 milliGRAM(s) Oral every 6 hours PRN Cough  ondansetron Injectable 4 milliGRAM(s) IV Push every 6 hours PRN Nausea and/or Vomiting  oxyCODONE    IR 5 milliGRAM(s) Oral every 4 hours PRN Moderate Pain (4 - 6)  oxyCODONE    IR 10 milliGRAM(s) Oral every 4 hours PRN Severe Pain (7 - 10)      PHYSICAL EXAM:  VITALS: T(C): 36.7 (11-03-20 @ 12:17)  T(F): 98.1 (11-03-20 @ 12:17), Max: 98.8 (11-03-20 @ 08:09)  HR: 72 (11-03-20 @ 12:17) (66 - 84)  BP: 111/70 (11-03-20 @ 12:17) (111/70 - 150/84)  RR:  (18 - 18)  SpO2:  (95% - 97%)  Wt(kg): --  GENERAL: NAD, well-groomed, well-developed  EYES: No proptosis, no injection  HEENT:  Atraumatic, Normocephalic, moist mucous membranes; nasal dressing with some dried blood evident improved from prior exam  RESPIRATORY: Clear to auscultation bilaterally; No rales, rhonchi, wheezing, or rubs  CARDIOVASCULAR: Regular rate and rhythm; No murmurs  GI: Soft, nontender, non distended, normal bowel sounds      POCT Blood Glucose.: 126 mg/dL (11-03-20 @ 08:17)  POCT Blood Glucose.: 209 mg/dL (11-02-20 @ 20:57)  POCT Blood Glucose.: 128 mg/dL (11-02-20 @ 17:14)  POCT Blood Glucose.: 205 mg/dL (11-02-20 @ 12:35)  POCT Blood Glucose.: 127 mg/dL (11-02-20 @ 08:40)  POCT Blood Glucose.: 121 mg/dL (11-01-20 @ 20:58)  POCT Blood Glucose.: 120 mg/dL (11-01-20 @ 17:28)  POCT Blood Glucose.: 108 mg/dL (11-01-20 @ 13:02)  POCT Blood Glucose.: 102 mg/dL (11-01-20 @ 08:36)  POCT Blood Glucose.: 112 mg/dL (10-31-20 @ 21:30)  POCT Blood Glucose.: 109 mg/dL (10-31-20 @ 17:24)  POCT Blood Glucose.: 117 mg/dL (10-31-20 @ 12:45)    11-03    128<L>  |  90<L>  |  20  ----------------------------<  228<H>  3.4<L>   |  23  |  0.80    EGFR if : 122  EGFR if non : 105    Ca    9.7      11-03  Mg     2.0     11-01  Phos  2.0     11-01      Thyroid Function Tests:  11-01 @ 12:02 TSH 0.48 FreeT4 0.6

## 2020-11-03 NOTE — PROGRESS NOTE ADULT - PROBLEM SELECTOR PLAN 1
- Nasal saline to nares QID  - Monitor for CSF leak, bleeding  - Monitor BP  - Pt has [facility] instructions

## 2020-11-03 NOTE — PROGRESS NOTE ADULT - PROBLEM SELECTOR PLAN 2
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   - Continue with hydrocortisone 10 mg at 8 am and 5 mg at 3 pm  - Sick day rules counselling   - Recheck HPA axis as outpatient and follow up with outpatient endocrinologist. 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   - Continue with hydrocortisone 10 mg po at 8 am and 5 mg po at 3 pm  - Sick day rules counselling   - Recheck HPA axis as outpatient and follow up with outpatient endocrinologist.

## 2020-11-03 NOTE — PROGRESS NOTE ADULT - ASSESSMENT
50 y/o male with h/o HTN, T2DN, Hyperlipidemia c/o pituitary macroadenoma, sp endoscopic endonasal transsphenoidal resection pituitary mass and repair small CSF leak. 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 Arndt    [facility] questions [phone].

## 2020-11-03 NOTE — PROGRESS NOTE ADULT - PROBLEM SELECTOR PLAN 1
Pre op hormonal testing only significant for low testosterone level as per patient. No reports available. Now s/p resection on 11/29/20.   - Follow up surgical pathology report  - Recheck HPA axis and TFTs as outpatient in a month or so along with other pituitary hormones including testosterone, prolactin, IGF-1.   - Visual field testing and endocrine follow up as outpatient. He will follow with his outpatient endocrinologist Dr. Deejay Kinney

## 2020-11-03 NOTE — PROGRESS NOTE ADULT - PROBLEM SELECTOR PLAN 3
- TSH 0.48 11/1/20; continue with decreased dose levothyroxine 50mcg po qdaily, to be given on empty stomach and wait 30 minutes before eating or taking other medications and at least 4 hrs apart from MVI and calcium.   - Recheck TFTs as outpatient as above

## 2020-11-03 NOTE — PROGRESS NOTE ADULT - ASSESSMENT
48 y/o male with h/o HTN, T2DM controlled (HbA1c 7%), Hyperlipidemia, presented with c/o pituitary macroadenoma found upon workup for a low testosterone level, now s/p resection on 11/29/20. Endocrine team consulted for post op evaluation.         50 y/o male with h/o HTN, T2DM controlled (HbA1c 7%), Hyperlipidemia, presented with c/o pituitary macroadenoma found upon workup for a low testosterone level, now s/p resection on 11/29/20. Endocrine team consulted for post op evaluation.     ***A/P not final until signed by attending Dr. Monsalve***        48 y/o male with h/o HTN, T2DM controlled (HbA1c 7%), Hyperlipidemia, presented with c/o pituitary macroadenoma found upon workup for a low testosterone level, now s/p resection on 11/29/20. Endocrine team consulted for post op evaluation.

## 2020-11-03 NOTE — PROGRESS NOTE ADULT - SUBJECTIVE AND OBJECTIVE BOX
SUBJECTIVE: Doing very well w/o complaints. NAD. Denies CSF Rhinorrhea or salty taste. Pt seen with Dr Bernal at bedside this AM.    OVERNIGHT EVENTS: None    Vital Signs Last 24 Hrs  T(C): 37.1 (03 Nov 2020 08:09), Max: 37.1 (03 Nov 2020 08:09)  T(F): 98.8 (03 Nov 2020 08:09), Max: 98.8 (03 Nov 2020 08:09)  HR: 70 (03 Nov 2020 08:09) (66 - 95)  BP: 136/78 (03 Nov 2020 08:09) (119/72 - 150/84)  BP(mean): --  RR: 18 (03 Nov 2020 08:09) (18 - 20)  SpO2: 97% (03 Nov 2020 08:09) (95% - 97%)  IVF: [X ] IVL [ ] NS+K@ Fluid Restriction 1500cc/day  DIET: [ ] Regular [X ] CCD [ ] Renal [ ] Puree [ ] Dysphagia [ ] Tube Feeds:   PCA: [ ] YES [X ] NO   KEITA: [ ] YES [X ] NO [ X] VOID   BM: [X] YES-on 10/30    DRAINS: None    PHYSICAL EXAM:    General: No Acute Distress     Neurological: Awake, alert oriented to person, place and time, Following Commands, PERRL, EOMI, Face Symmetrical, Speech Fluent, Moving all extremities, Muscle Strength normal in all four extremities, No Drift, Sensation to Light Touch Intact    Pulmonary: Clear to Auscultation, No Rales, No Rhonchi, No Wheezes     Cardiovascular: S1, S2, Regular Rate and Rhythm     Gastrointestinal: Soft, Nontender, Nondistended     Incision: Moustache dressing on-no drainage. No CSF Rhinorrhea or salty taste    LABS:   11-02    125<L>  |  87<L>  |  18  ----------------------------<  188<H>  4.5   |  21<L>  |  0.88    Ca    9.6      02 Nov 2020 21:57    Osmolality, Random Urine: 856 mos/kg (11.01.20 @ 17:43)    11-02 @ 07:01  -  11-03 @ 07:00  --------------------------------------------------------  IN: 600 mL / OUT: 1450 mL / NET: -850 mL    IMAGING:   < from: MR Head w/wo IV Cont (10.30.20 @ 14:05) >  IMPRESSION: Since the prior examination of 10/20/2020 there has been transsphenoidal resection of the large pituitary macroadenoma. The residual soft tissue in the sella and suprasellar region appears of similar size with only minimal peripheral enhancement consistent with postoperative resection without interval loss of height. There is no change in mild mass effect on the optic chiasm.  No acute infarcts. Normal intracranial enhancement.    < from: [facility] Duplex Lower Ext Vein Scan, Bilat (10.29.20 @ 16:15) >    IMPRESSION:  No evidence of deep venous thrombosis in either lower extremity.    MEDICATIONS  (STANDING):  atorvastatin 10 milliGRAM(s) Oral at bedtime  bisacodyl 5 milliGRAM(s) Oral at bedtime  dextrose 5%. 1000 milliLiter(s) (50 mL/Hr) IV Continuous <Continuous>  dextrose 50% Injectable 12.5 Gram(s) IV Push once  dextrose 50% Injectable 25 Gram(s) IV Push once  dextrose 50% Injectable 25 Gram(s) IV Push once  enoxaparin Injectable 40 milliGRAM(s) SubCutaneous <User Schedule>  hydrocortisone 5 milliGRAM(s) Oral <User Schedule>  hydrocortisone 10 milliGRAM(s) Oral <User Schedule>  influenza   Vaccine 0.5 milliLiter(s) IntraMuscular once  insulin lispro (ADMELOG) corrective regimen sliding scale   SubCutaneous three times a day before meals  insulin lispro (ADMELOG) corrective regimen sliding scale   SubCutaneous at bedtime  levothyroxine 50 MICROGram(s) Oral daily  losartan 50 milliGRAM(s) Oral daily  metoprolol succinate ER 50 milliGRAM(s) Oral daily  senna 2 Tablet(s) Oral at bedtime  sodium chloride 2 Gram(s) Oral two times a day  sodium chloride 0.65% Nasal 2 Spray(s) Both Nostrils four times a day    MEDICATIONS  (PRN):  dextrose 40% Gel 15 Gram(s) Oral once PRN Blood Glucose LESS THAN 70 milliGRAM(s)/deciliter  glucagon  Injectable 1 milliGRAM(s) IntraMuscular once PRN Glucose LESS THAN 70 milligrams/deciliter  guaiFENesin   Syrup  (Sugar-Free) 100 milliGRAM(s) Oral every 6 hours PRN Cough  ondansetron Injectable 4 milliGRAM(s) IV Push every 6 hours PRN Nausea and/or Vomiting  oxyCODONE    IR 10 milliGRAM(s) Oral every 4 hours PRN Severe Pain (7 - 10)  oxyCODONE    IR 5 milliGRAM(s) Oral every 4 hours PRN Moderate Pain (4 - 6)

## 2020-11-03 NOTE — PROGRESS NOTE ADULT - SUBJECTIVE AND OBJECTIVE BOX
ENT ISSUE/POD: TSRP with left nasoseptal flap for small CSF leak POD#5    HPI: 48 y/o male with h/o HTN, T2DN, Hyperlipidemia c/o pituitary macroadenoma found upon workup for a low testosterone level, denies headaches or difficulties with his vision. Today he presents for scheduled TSRP with left nasoseptal flap for small CSF leak POD#5. Pt. was seen post op for small amount of epistaxis from left nare. Pt. was packed in the OR with nasopore but continued to bleed from left nare intermittently controlled with a small amount of nasopore. Pt. denies salty taste in mouth , headaches, N/V.        PAST MEDICAL & SURGICAL HISTORY:  Pituitary macroadenoma    History of high cholesterol    HTN (hypertension)    T2DM (type 2 diabetes mellitus)    No significant past surgical history      Allergies    No Known Allergies    Intolerances      MEDICATIONS  (STANDING):  atorvastatin 10 milliGRAM(s) Oral at bedtime  bisacodyl 5 milliGRAM(s) Oral at bedtime  dextrose 5%. 1000 milliLiter(s) (50 mL/Hr) IV Continuous <Continuous>  dextrose 50% Injectable 12.5 Gram(s) IV Push once  dextrose 50% Injectable 25 Gram(s) IV Push once  dextrose 50% Injectable 25 Gram(s) IV Push once  enoxaparin Injectable 40 milliGRAM(s) SubCutaneous <User Schedule>  hydrocortisone 5 milliGRAM(s) Oral <User Schedule>  hydrocortisone 10 milliGRAM(s) Oral <User Schedule>  influenza   Vaccine 0.5 milliLiter(s) IntraMuscular once  insulin lispro (ADMELOG) corrective regimen sliding scale   SubCutaneous three times a day before meals  insulin lispro (ADMELOG) corrective regimen sliding scale   SubCutaneous at bedtime  levothyroxine 50 MICROGram(s) Oral daily  losartan 50 milliGRAM(s) Oral daily  metoprolol succinate ER 50 milliGRAM(s) Oral daily  senna 2 Tablet(s) Oral at bedtime  sodium chloride 2 Gram(s) Oral two times a day  sodium chloride 0.65% Nasal 2 Spray(s) Both Nostrils four times a day    MEDICATIONS  (PRN):  dextrose 40% Gel 15 Gram(s) Oral once PRN Blood Glucose LESS THAN 70 milliGRAM(s)/deciliter  glucagon  Injectable 1 milliGRAM(s) IntraMuscular once PRN Glucose LESS THAN 70 milligrams/deciliter  guaiFENesin   Syrup  (Sugar-Free) 100 milliGRAM(s) Oral every 6 hours PRN Cough  ondansetron Injectable 4 milliGRAM(s) IV Push every 6 hours PRN Nausea and/or Vomiting  oxyCODONE    IR 10 milliGRAM(s) Oral every 4 hours PRN Severe Pain (7 - 10)  oxyCODONE    IR 5 milliGRAM(s) Oral every 4 hours PRN Moderate Pain (4 - 6)      Social History: see consult    Family history: see consult    ROS:   ENT: all negative except as noted in HPI   Pulm: denies SOB, cough, hemoptysis  Neuro: denies numbness/tingling, loss of sensation  Endo: denies heat/cold intolerance, excessive sweating      Vital Signs Last 24 Hrs  T(C): 36.3 (03 Nov 2020 04:54), Max: 36.6 (02 Nov 2020 12:16)  T(F): 97.4 (03 Nov 2020 04:54), Max: 97.9 (02 Nov 2020 12:16)  HR: 75 (03 Nov 2020 04:54) (66 - 95)  BP: 130/84 (03 Nov 2020 04:54) (119/72 - 150/84)  BP(mean): --  RR: 18 (03 Nov 2020 04:54) (18 - 20)  SpO2: 97% (03 Nov 2020 04:54) (95% - 97%)     11-02    125<L>  |  87<L>  |  18  ----------------------------<  188<H>  4.5   |  21<L>  |  0.88    Ca    9.6      02 Nov 2020 21:57  Phos  2.0     11-01  Mg     2.0     11-01         PHYSICAL EXAM:  Gen: NAD  Skin: No rashes, bruises, or lesions  Head: Normocephalic, Atraumatic  Face: no edema, erythema, or fluctuance. Parotid glands soft without mass  Eyes: no scleral injection  Nose: B/L nares: mustache dressing in place, B/L minimal oozing of blood/mucus, no clear discharge noted.   Mouth: Dry blood in posterior pharynx. No Stridor / Drooling / Trismus. Mucosa moist, tongue/uvula midline  Neck: Flat, supple, no lymphadenopathy, trachea midline, no masses  Lymphatic: No lymphadenopathy  Resp: breathing easily, no stridor  Neuro: facial nerve intact, no facial droop

## 2020-11-03 NOTE — PROGRESS NOTE ADULT - ASSESSMENT
50 y/o male s/p TSRP with left nasoseptal flap for small CSF leak POD #5. Post op bleeding controlled with nasopore bilaterally. No active bleeding or clear discharge this AM. No salty or metallic taste in mouth.

## 2020-11-03 NOTE — PROGRESS NOTE ADULT - ASSESSMENT
50 y/o male with h/o HTN, T2DN, Hyperlipidemia c/o pituitary macroadenoma found upon workup for a low testosterone level, denies headaches or difficulties with his vision. Today he presents to [facility] for scheduled Endoscopic Transphenoidal Removal Pituitary Tumor on 10/29/20.   ***COVID swab scheduled for 10/26/20***   (22 Oct 2020 08:35)    PROCEDURE: Adm 10/29 TSP, Sm CSF leak repaired w/duragen and nasal flap (Resection, neoplasm, pituitary, transsphenoidal)    POD#5    PLAN:  Neuro: Sodium trending down-FU Stat BMP and in AM.  Cont NaCl Tabs 2gm BID and FR 1500cc/day. Cont Hydrocortisone 5am & 10mg PM. Path-P FU. Endo FU. Inc activity/OOB. DC plan for 11/4 once Endo clear for discharge. Visual field testing and endocrine follow up as outpatient. He will follow with his outpatient endocrinologist Dr. Deejay Kinney.    ENT note of 11/3-50 y/o male s/p TSRP with left nasoseptal flap for small CSF leak POD #5. Post op bleeding controlled with nasopore bilaterally. No active bleeding or clear discharge this AM. No salty or metallic taste in mouth.  Problem: Pituitary macroadenoma.  Plan: - Nasal saline to nares QID - Monitor for CSF leak, bleeding - Monitor BP - Pt has [facility] instructions. Electronic Signatures: Deepak Cain (MD)  (Signature Pending) Co-Signer: Progress Note, Subjective and Objective, Assessment and Plan Sae Bar (PA)     Endo note of 11/2-Endocrine team consulted for post op evaluation. Problem: Pituitary macroadenoma.  Plan: Pre op hormonal testing only significant for low testosterone level as per patient. No reports available. Now s/p resection on 11/29/20. - Follow up surgical pathology report. - Recheck HPA axis and TFTs as outpatient in a month or so along with other pituitary hormones including testosterone, prolactin, IGF-1. - Visual field testing and endocrine follow up as outpatient. He will follow with his outpatient endocrinologist Dr. Deejay Kinney.Problem: Secondary adrenal insufficiency.  Plan: - Low am cortisol noted on POD # 3 (2.3) after receiving steroids intraoperatively, likely indicating secondary AI. - Continue with hydrocortisone 10 mg at 8 am and 5 mg at 3 pm - Sick day rules counselling - Recheck HPA axis as outpatient and follow up with outpatient endocrinologist.  Problem: Secondary hypothyroidism.  Plan: - TSH 0.48 11/1/20; decrease levothyroxine from 75 mcg to 50mcg po qdaily, to be given on empty stomach and wait 30 minutes before eating or taking other medications and at least 4 hrs apart from MVI and calcium. - Recheck TFTs as outpatient as above. Problem: Hypogonadotropic hypogonadism.  Plan: - Recheck testosterone levels as outpatient and resume replacement therapy. - Last injection ~2weeks pre-operatively. Problem: Type 2 diabetes mellitus without complication, without long-term current use of insulin.  Plan: - Well controlled, A1c 7% 10/2020, on Metformin and Jardiance as outpatient - continue with admelog correctional scale qac and qhs as inpatient, FS at goal 100-180. - consistent carb diet - check FS qac and qhs - plan to dc on home regimen. Problem: Hyponatremia.Plan: -Can consider Fluid restrict 1-1.5L for c/f SIADH with Na 127. Attending Attestation: Patient seen and examined. Agree with note as above with addendum: patient with hyponatremia to 127 and euvolemic, likely SIADH as he is POD#4 and had improper replacement of his steroids. Primary team started stress dose steroids, but I don't think that this is necessary. Would limit to 1-1.5L of fluid. If this persists will send urine lytes to confirm diagnosis and serum uric acid. Discussed with WILMER Marte. Beata Monsalve MD Pager: [phone], between 9am-6pm  Nights and Weekends: [phone] .  25 minutes spent on total encounter; more than 50% of the visit was spent counseling and/or coordinating care by the attending physician. Electronic Signatures: Beata Monsalve (MD)    Respiratory: Patient instructed to use incentive spirometer [ X] YES [ ] NO              DVT ppx: [X ] SQL [ ] SQH and Venodynes [ ] Left [ ] Right [ X] Bilateral    Discharge Planning:  The patient was evaluated by PT/OT no needs recommended.     More than 30 minutes spent on total encounter: more than 50% of the visit was spent on educating the patient and family regarding condition, medications, follow up plans, signs and symptoms to be concerned with, preparing paperwork, and questions answered regarding discharge.      Assessment:  Please Check When Present   []  GCS  E   V  M     Heart Failure: []Acute, [] acute on chronic , []chronic  Heart Failure:  [] Diastolic (HFpEF), [] Systolic (HFrEF), []Combined (HFpEF and HFrEF), [] RHF, [] Pulm HTN, [] Other    [] SUKUMAR, [] ATN, [] AIN, [] other  [] CKD1, [] CKD2, [] CKD 3, [] CKD 4, [] CKD 5, []ESRD    Encephalopathy: [] Metabolic, [] Hepatic, [] toxic, [] Neurological, [] Other    Abnormal Nurtitional Status: [] malnurtition (see nutrition note), [ ]underweight: BMI < 19, [] morbid obesity: BMI >40, [] Cachexia    [] Sepsis  [] hypovolemic shock,[] cardiogenic shock, [] hemorrhagic shock, [] neuogenic shock  [] Acute Respiratory Failure  []Cerebral edema, [] Brain compression/ herniation,   [] Functional quadriplegia  [] Acute blood loss anemia

## 2020-11-03 NOTE — PROGRESS NOTE ADULT - SUBJECTIVE AND OBJECTIVE BOX
Patient is a 49y old  Male who presents with a chief complaint of Pituitary Macroadenoma resection (02 Nov 2020 14:56)      SUBJECTIVE / OVERNIGHT EVENTS:    Patient seen and examined.   denies ha dizziness. no leaking from nose. no cp sob.     Vital Signs Last 24 Hrs  T(C): 36.7 (03 Nov 2020 12:17), Max: 37.1 (03 Nov 2020 08:09)  T(F): 98.1 (03 Nov 2020 12:17), Max: 98.8 (03 Nov 2020 08:09)  HR: 72 (03 Nov 2020 12:17) (66 - 84)  BP: 111/70 (03 Nov 2020 12:17) (111/70 - 150/84)  BP(mean): --  RR: 18 (03 Nov 2020 12:17) (18 - 18)  SpO2: 97% (03 Nov 2020 12:17) (95% - 97%)  I&O's Summary    02 Nov 2020 07:01  -  03 Nov 2020 07:00  --------------------------------------------------------  IN: 600 mL / OUT: 1450 mL / NET: -850 mL    03 Nov 2020 07:01  -  03 Nov 2020 12:28  --------------------------------------------------------  IN: 300 mL / OUT: 900 mL / NET: -600 mL        PE:  GENERAL: NAD, Comfortable, nasal dressing cdi  HEAD:  Atraumatic, Normocephalic  CHEST/LUNG: Clear to auscultation bilaterally; No wheeze  HEART: Regular rate and rhythm; No murmurs, rubs, or gallops  ABDOMEN: Soft, Nontender, Nondistended; Bowel sounds present  Neuro: AAOx3, no focal deficit, 5/5 b/l extremities  EXTREMITIES:  2+ Peripheral Pulses, No clubbing, cyanosis, or edema  SKIN: No rashes or lesions    LABS:    11-03    128<L>  |  90<L>  |  20  ----------------------------<  228<H>  3.4<L>   |  23  |  0.80    Ca    9.7      03 Nov 2020 11:54        CAPILLARY BLOOD GLUCOSE      POCT Blood Glucose.: 126 mg/dL (03 Nov 2020 08:17)  POCT Blood Glucose.: 209 mg/dL (02 Nov 2020 20:57)  POCT Blood Glucose.: 128 mg/dL (02 Nov 2020 17:14)  POCT Blood Glucose.: 205 mg/dL (02 Nov 2020 12:35)            RADIOLOGY & ADDITIONAL TESTS:    Imaging Personally Reviewed:  [x] YES  [ ] NO    Consultant(s) Notes Reviewed:  [x] YES  [ ] NO    MEDICATIONS  (STANDING):  atorvastatin 10 milliGRAM(s) Oral at bedtime  bisacodyl 5 milliGRAM(s) Oral at bedtime  dextrose 5%. 1000 milliLiter(s) (50 mL/Hr) IV Continuous <Continuous>  dextrose 50% Injectable 12.5 Gram(s) IV Push once  dextrose 50% Injectable 25 Gram(s) IV Push once  dextrose 50% Injectable 25 Gram(s) IV Push once  enoxaparin Injectable 40 milliGRAM(s) SubCutaneous <User Schedule>  hydrocortisone 5 milliGRAM(s) Oral <User Schedule>  hydrocortisone 10 milliGRAM(s) Oral <User Schedule>  influenza   Vaccine 0.5 milliLiter(s) IntraMuscular once  insulin lispro (ADMELOG) corrective regimen sliding scale   SubCutaneous three times a day before meals  insulin lispro (ADMELOG) corrective regimen sliding scale   SubCutaneous at bedtime  levothyroxine 50 MICROGram(s) Oral daily  losartan 50 milliGRAM(s) Oral daily  metoprolol succinate ER 50 milliGRAM(s) Oral daily  senna 2 Tablet(s) Oral at bedtime  sodium chloride 2 Gram(s) Oral two times a day  sodium chloride 0.65% Nasal 2 Spray(s) Both Nostrils four times a day    MEDICATIONS  (PRN):  dextrose 40% Gel 15 Gram(s) Oral once PRN Blood Glucose LESS THAN 70 milliGRAM(s)/deciliter  glucagon  Injectable 1 milliGRAM(s) IntraMuscular once PRN Glucose LESS THAN 70 milligrams/deciliter  guaiFENesin   Syrup  (Sugar-Free) 100 milliGRAM(s) Oral every 6 hours PRN Cough  ondansetron Injectable 4 milliGRAM(s) IV Push every 6 hours PRN Nausea and/or Vomiting  oxyCODONE    IR 10 milliGRAM(s) Oral every 4 hours PRN Severe Pain (7 - 10)  oxyCODONE    IR 5 milliGRAM(s) Oral every 4 hours PRN Moderate Pain (4 - 6)      Care Discussed with Consultants/Other Providers [x] YES  [ ] NO    HEALTH ISSUES - PROBLEM Dx:  Hyponatremia  Hyponatremia    Hypogonadotropic hypogonadism  Hypogonadotropic hypogonadism    Secondary hypothyroidism  Secondary hypothyroidism    Secondary adrenal insufficiency  Secondary adrenal insufficiency    Hyperlipidemia, unspecified hyperlipidemia type  Hyperlipidemia, unspecified hyperlipidemia type    Essential hypertension  Essential hypertension    Type 2 diabetes mellitus without complication, without long-term current use of insulin  Type 2 diabetes mellitus without complication, without long-term current use of insulin    Need for prophylactic measure    T2DM (type 2 diabetes mellitus)    HTN (hypertension)    Pituitary macroadenoma

## 2020-11-04 ENCOUNTER — TRANSCRIPTION ENCOUNTER (OUTPATIENT)
Age: 50
End: 2020-11-04

## 2020-11-04 LAB
ANION GAP SERPL CALC-SCNC: 13 MMOL/L — SIGNIFICANT CHANGE UP (ref 5–17)
ANION GAP SERPL CALC-SCNC: 13 MMOL/L — SIGNIFICANT CHANGE UP (ref 5–17)
ANION GAP SERPL CALC-SCNC: 14 MMOL/L — SIGNIFICANT CHANGE UP (ref 5–17)
BUN SERPL-MCNC: 16 MG/DL — SIGNIFICANT CHANGE UP (ref 7–23)
BUN SERPL-MCNC: 19 MG/DL — SIGNIFICANT CHANGE UP (ref 7–23)
BUN SERPL-MCNC: 19 MG/DL — SIGNIFICANT CHANGE UP (ref 7–23)
CALCIUM SERPL-MCNC: 9.2 MG/DL — SIGNIFICANT CHANGE UP (ref 8.4–10.5)
CALCIUM SERPL-MCNC: 9.4 MG/DL — SIGNIFICANT CHANGE UP (ref 8.4–10.5)
CALCIUM SERPL-MCNC: 9.5 MG/DL — SIGNIFICANT CHANGE UP (ref 8.4–10.5)
CHLORIDE SERPL-SCNC: 100 MMOL/L — SIGNIFICANT CHANGE UP (ref 96–108)
CHLORIDE SERPL-SCNC: 100 MMOL/L — SIGNIFICANT CHANGE UP (ref 96–108)
CHLORIDE SERPL-SCNC: 99 MMOL/L — SIGNIFICANT CHANGE UP (ref 96–108)
CO2 SERPL-SCNC: 21 MMOL/L — LOW (ref 22–31)
CO2 SERPL-SCNC: 24 MMOL/L — SIGNIFICANT CHANGE UP (ref 22–31)
CO2 SERPL-SCNC: 25 MMOL/L — SIGNIFICANT CHANGE UP (ref 22–31)
CREAT SERPL-MCNC: 0.85 MG/DL — SIGNIFICANT CHANGE UP (ref 0.5–1.3)
CREAT SERPL-MCNC: 0.95 MG/DL — SIGNIFICANT CHANGE UP (ref 0.5–1.3)
CREAT SERPL-MCNC: 1.17 MG/DL — SIGNIFICANT CHANGE UP (ref 0.5–1.3)
GLUCOSE BLDC GLUCOMTR-MCNC: 128 MG/DL — HIGH (ref 70–99)
GLUCOSE BLDC GLUCOMTR-MCNC: 159 MG/DL — HIGH (ref 70–99)
GLUCOSE BLDC GLUCOMTR-MCNC: 174 MG/DL — HIGH (ref 70–99)
GLUCOSE BLDC GLUCOMTR-MCNC: 202 MG/DL — HIGH (ref 70–99)
GLUCOSE SERPL-MCNC: 130 MG/DL — HIGH (ref 70–99)
GLUCOSE SERPL-MCNC: 141 MG/DL — HIGH (ref 70–99)
GLUCOSE SERPL-MCNC: 166 MG/DL — HIGH (ref 70–99)
OSMOLALITY UR: 379 MOS/KG — SIGNIFICANT CHANGE UP (ref 300–900)
POTASSIUM SERPL-MCNC: 3.7 MMOL/L — SIGNIFICANT CHANGE UP (ref 3.5–5.3)
POTASSIUM SERPL-MCNC: 3.8 MMOL/L — SIGNIFICANT CHANGE UP (ref 3.5–5.3)
POTASSIUM SERPL-MCNC: 3.9 MMOL/L — SIGNIFICANT CHANGE UP (ref 3.5–5.3)
POTASSIUM SERPL-SCNC: 3.7 MMOL/L — SIGNIFICANT CHANGE UP (ref 3.5–5.3)
POTASSIUM SERPL-SCNC: 3.8 MMOL/L — SIGNIFICANT CHANGE UP (ref 3.5–5.3)
POTASSIUM SERPL-SCNC: 3.9 MMOL/L — SIGNIFICANT CHANGE UP (ref 3.5–5.3)
SODIUM SERPL-SCNC: 135 MMOL/L — SIGNIFICANT CHANGE UP (ref 135–145)
SODIUM SERPL-SCNC: 137 MMOL/L — SIGNIFICANT CHANGE UP (ref 135–145)
SODIUM SERPL-SCNC: 137 MMOL/L — SIGNIFICANT CHANGE UP (ref 135–145)
SP GR UR STRIP: 1.01 — SIGNIFICANT CHANGE UP (ref 1.01–1.02)
SURGICAL PATHOLOGY STUDY: SIGNIFICANT CHANGE UP

## 2020-11-04 PROCEDURE — 99024 POSTOP FOLLOW-UP VISIT: CPT

## 2020-11-04 PROCEDURE — 99232 SBSQ HOSP IP/OBS MODERATE 35: CPT | Mod: GC

## 2020-11-04 RX ORDER — LEVOTHYROXINE SODIUM 125 MCG
75 TABLET ORAL DAILY
Refills: 0 | Status: DISCONTINUED | OUTPATIENT
Start: 2020-11-05 | End: 2020-11-05

## 2020-11-04 RX ORDER — LEVOTHYROXINE SODIUM 125 MCG
75 TABLET ORAL DAILY
Refills: 0 | Status: DISCONTINUED | OUTPATIENT
Start: 2020-11-04 | End: 2020-11-04

## 2020-11-04 RX ADMIN — LOSARTAN POTASSIUM 50 MILLIGRAM(S): 100 TABLET, FILM COATED ORAL at 05:34

## 2020-11-04 RX ADMIN — Medication 5 MILLIGRAM(S): at 14:43

## 2020-11-04 RX ADMIN — ENOXAPARIN SODIUM 40 MILLIGRAM(S): 100 INJECTION SUBCUTANEOUS at 17:21

## 2020-11-04 RX ADMIN — Medication 2 SPRAY(S): at 05:34

## 2020-11-04 RX ADMIN — Medication 50 MICROGRAM(S): at 05:34

## 2020-11-04 RX ADMIN — SODIUM CHLORIDE 2 GRAM(S): 9 INJECTION INTRAMUSCULAR; INTRAVENOUS; SUBCUTANEOUS at 05:34

## 2020-11-04 RX ADMIN — Medication 2 SPRAY(S): at 11:33

## 2020-11-04 RX ADMIN — Medication 2 SPRAY(S): at 23:45

## 2020-11-04 RX ADMIN — OXYCODONE HYDROCHLORIDE 10 MILLIGRAM(S): 5 TABLET ORAL at 21:09

## 2020-11-04 RX ADMIN — Medication 50 MILLIGRAM(S): at 05:34

## 2020-11-04 RX ADMIN — Medication 5 MILLIGRAM(S): at 21:07

## 2020-11-04 RX ADMIN — Medication 2 SPRAY(S): at 17:22

## 2020-11-04 RX ADMIN — ATORVASTATIN CALCIUM 10 MILLIGRAM(S): 80 TABLET, FILM COATED ORAL at 21:07

## 2020-11-04 RX ADMIN — Medication 4: at 17:21

## 2020-11-04 RX ADMIN — Medication 2: at 12:39

## 2020-11-04 RX ADMIN — OXYCODONE HYDROCHLORIDE 10 MILLIGRAM(S): 5 TABLET ORAL at 22:00

## 2020-11-04 RX ADMIN — Medication 10 MILLIGRAM(S): at 09:16

## 2020-11-04 RX ADMIN — OXYCODONE HYDROCHLORIDE 10 MILLIGRAM(S): 5 TABLET ORAL at 00:00

## 2020-11-04 RX ADMIN — SENNA PLUS 2 TABLET(S): 8.6 TABLET ORAL at 21:07

## 2020-11-04 NOTE — DIETITIAN INITIAL EVALUATION ADULT. - PERTINENT MEDS FT
MEDICATIONS  (STANDING):  atorvastatin 10 milliGRAM(s) Oral at bedtime  bisacodyl 5 milliGRAM(s) Oral at bedtime  dextrose 5%. 1000 milliLiter(s) (50 mL/Hr) IV Continuous <Continuous>  dextrose 50% Injectable 12.5 Gram(s) IV Push once  dextrose 50% Injectable 25 Gram(s) IV Push once  dextrose 50% Injectable 25 Gram(s) IV Push once  enoxaparin Injectable 40 milliGRAM(s) SubCutaneous <User Schedule>  hydrocortisone 5 milliGRAM(s) Oral <User Schedule>  hydrocortisone 10 milliGRAM(s) Oral <User Schedule>  influenza   Vaccine 0.5 milliLiter(s) IntraMuscular once  insulin lispro (ADMELOG) corrective regimen sliding scale   SubCutaneous three times a day before meals  insulin lispro (ADMELOG) corrective regimen sliding scale   SubCutaneous at bedtime  levothyroxine 50 MICROGram(s) Oral daily  losartan 50 milliGRAM(s) Oral daily  metoprolol succinate ER 50 milliGRAM(s) Oral daily  senna 2 Tablet(s) Oral at bedtime  sodium chloride 0.65% Nasal 2 Spray(s) Both Nostrils four times a day    MEDICATIONS  (PRN):  dextrose 40% Gel 15 Gram(s) Oral once PRN Blood Glucose LESS THAN 70 milliGRAM(s)/deciliter  glucagon  Injectable 1 milliGRAM(s) IntraMuscular once PRN Glucose LESS THAN 70 milligrams/deciliter  guaiFENesin   Syrup  (Sugar-Free) 100 milliGRAM(s) Oral every 6 hours PRN Cough  ondansetron Injectable 4 milliGRAM(s) IV Push every 6 hours PRN Nausea and/or Vomiting  oxyCODONE    IR 5 milliGRAM(s) Oral every 4 hours PRN Moderate Pain (4 - 6)  oxyCODONE    IR 10 milliGRAM(s) Oral every 4 hours PRN Severe Pain (7 - 10)

## 2020-11-04 NOTE — PROGRESS NOTE ADULT - SUBJECTIVE AND OBJECTIVE BOX
SUBJECTIVE: HPI:  48 y/o male with h/o HTN, T2DN, Hyperlipidemia c/o pituitary macroadenoma found upon workup for a low testosterone level, denies headaches or difficulties with his vision. Today he presents to [facility] for scheduled Endoscopic Transphenoidal Removal Pituitary Tumor on 10/29/20.   ***COVID swab scheduled for 10/26/20***   (22 Oct 2020 08:35)      OVERNIGHT EVENTS: No acute events overnight, patient seen and evaluated at bedside, no acute complaints. Pain well controlled with pain medications.     Vital Signs Last 24 Hrs  T(C): 36.6 (04 Nov 2020 08:44), Max: 36.8 (03 Nov 2020 16:03)  T(F): 97.9 (04 Nov 2020 08:44), Max: 98.3 (03 Nov 2020 16:03)  HR: 60 (04 Nov 2020 08:44) (60 - 89)  BP: 123/76 (04 Nov 2020 08:44) (98/64 - 132/84)  BP(mean): --  RR: 18 (04 Nov 2020 08:44) (18 - 18)  SpO2: 98% (04 Nov 2020 08:44) (96% - 98%)    PHYSICAL EXAM:    General: No Acute Distress     Neurological: Awake, alert oriented to person, place and time, Following Commands, PERRL, EOMI, Face Symmetrical, Speech Fluent, Moving all extremities, Muscle Strength normal in all four extremities, No Drift, Sensation to Light Touch Intact    Pulmonary: Clear to Auscultation, No Rales, No Rhonchi, No Wheezes     Cardiovascular: S1, S2, Regular Rate and Rhythm     Gastrointestinal: Soft, Nontender, Nondistended     Incision: None    LABS:   11-04    137  |  100  |  19  ----------------------------<  130<H>  3.7   |  24  |  0.95    Ca    9.4      04 Nov 2020 05:36          11-03 @ 07:01  -  11-04 @ 07:00  --------------------------------------------------------  IN: 1050 mL / OUT: 1600 mL / NET: -550 mL    11-04 @ 07:01 - 11-04 @ 10:34  --------------------------------------------------------  IN: 0 mL / OUT: 325 mL / NET: -325 mL      DRAINS: None    MEDICATIONS:  Antibiotics:    Neuro:  ondansetron Injectable 4 milliGRAM(s) IV Push every 6 hours PRN Nausea and/or Vomiting  oxyCODONE    IR 5 milliGRAM(s) Oral every 4 hours PRN Moderate Pain (4 - 6)  oxyCODONE    IR 10 milliGRAM(s) Oral every 4 hours PRN Severe Pain (7 - 10)    Cardiac:  losartan 50 milliGRAM(s) Oral daily  metoprolol succinate ER 50 milliGRAM(s) Oral daily    Pulm:  guaiFENesin   Syrup  (Sugar-Free) 100 milliGRAM(s) Oral every 6 hours PRN Cough    GI/:  bisacodyl 5 milliGRAM(s) Oral at bedtime  senna 2 Tablet(s) Oral at bedtime    Other:   atorvastatin 10 milliGRAM(s) Oral at bedtime  dextrose 40% Gel 15 Gram(s) Oral once PRN Blood Glucose LESS THAN 70 milliGRAM(s)/deciliter  dextrose 5%. 1000 milliLiter(s) IV Continuous <Continuous>  dextrose 50% Injectable 12.5 Gram(s) IV Push once  dextrose 50% Injectable 25 Gram(s) IV Push once  dextrose 50% Injectable 25 Gram(s) IV Push once  enoxaparin Injectable 40 milliGRAM(s) SubCutaneous <User Schedule>  glucagon  Injectable 1 milliGRAM(s) IntraMuscular once PRN Glucose LESS THAN 70 milligrams/deciliter  hydrocortisone 5 milliGRAM(s) Oral <User Schedule>  hydrocortisone 10 milliGRAM(s) Oral <User Schedule>  influenza   Vaccine 0.5 milliLiter(s) IntraMuscular once  insulin lispro (ADMELOG) corrective regimen sliding scale   SubCutaneous three times a day before meals  insulin lispro (ADMELOG) corrective regimen sliding scale   SubCutaneous at bedtime  levothyroxine 50 MICROGram(s) Oral daily  sodium chloride 0.65% Nasal 2 Spray(s) Both Nostrils four times a day    DIET: [] Regular [x - w/ 1.5L fluid restriction] CCD [] Renal [] Puree [] Dysphagia [] Tube Feeds:     IMAGING:   < from: MR Head w/wo IV Cont (10.30.20 @ 14:05) >  IMPRESSION: Since the prior examination of 10/20/2020 there has been transsphenoidal resection of the large pituitary macroadenoma. The residual soft tissue in the sella and suprasellar region appears of similar size with only minimal peripheral enhancement consistent with postoperative resection without interval loss of height. There is no change in mild mass effect on the optic chiasm.    No acute infarcts. Normal intracranial enhancement.      LEONARDA MORELAND MD, ATTENDING RADIOLOGIST  This document has been electronically signed. Oct 30 2020  2:29PM    < end of copied text >

## 2020-11-04 NOTE — DIETITIAN INITIAL EVALUATION ADULT. - PERTINENT LABORATORY DATA
11-04 Na 137 mmol/L Glu 166 mg/dL<H> K+ 3.9 mmol/L Cr  1.17 mg/dL BUN 19 mg/dL Phos n/a   Alb n/a   PAB n/a   Hgb n/a   Hct n/a, A1C 7(H)  CAPILLARY BLOOD GLUCOSE      POCT Blood Glucose.: 174 mg/dL (04 Nov 2020 12:31)  POCT Blood Glucose.: 128 mg/dL (04 Nov 2020 08:39)  POCT Blood Glucose.: 175 mg/dL (03 Nov 2020 21:12)  POCT Blood Glucose.: 156 mg/dL (03 Nov 2020 17:05)

## 2020-11-04 NOTE — PROGRESS NOTE ADULT - PROBLEM SELECTOR PLAN 1
- Nasal saline to nares QID  - Monitor for CSF leak, bleeding  - Monitor BP  - Pt has [facility] instructions.

## 2020-11-04 NOTE — DISCHARGE NOTE PROVIDER - NSDCFUADDINST_GEN_ALL_CORE_FT
Please make all necessary appointment and follow up. Please do NOT take any Aspirin or NSAIDs (Ibuprofen, Advil, Aleve, Motrin) until cleared by your neurosurgeon.   Please follow skull base precautions which are the following: NO STRAWS, NO INCENTIVE SPIROMETRY, NO NASAL TRAUMA / NO NASAL SWABBING, NO CPAP/BIPAP, NO NOSE BLOWING. No heavy lifting, bending, twisting, straining. If you have any salty taste in the back of your throat or any clear discharge / fluid from your nose, altered mental status, pain uncontrolled by pain medications, seizures, chest pain or shortness of breath please come to the emergency room.

## 2020-11-04 NOTE — PROGRESS NOTE ADULT - ASSESSMENT
ASSESSMENT AND PLAN: 48 y/o male with h/o HTN, DM Type 2, Hyperlipidemia c/o pituitary macroadenoma found upon workup for a low testosterone level, denies headaches or difficulties with his vision. s/p TSP, w/ small CSF leak repaired with duragen and nasoseptal flap POD 6    NEURO:   - Continue neuro checks q 4  - Monitor for CSF leak   - Continue with skull base precautions - no straws, no incentive spirometer, no nasal cannula, no nasal trauma (no swabbing), no nose blowing  - Continue nasal spray  - Continue with pain control w/ tylenol prn and oxycodone prn    PULM:   - No incentive spirometry   - On room air, O2Sat>96%    CV:  - -160  - Continue Metoprolol and Losartan for HTN  - Continue Lipitor for HLD    ENDO:   - Goal Euglycemia 120-180s  - On HISS, continue to monitor FS - stable, continue CCD diet, will continue his DM Typer 2 home regimen on discharge  - Monitor for DI, monitor urine output  - Being monitored for Hyponatremia secondary to SIADH, upon review of chart - his Na has been 125-128, was placed on 1.5L fluid restriction and salt tabs. Na this morning was 137 from yesterdays 128. Salt tabs discontinue with repeat BMP for this afternoon. Will follow with Endocrine   - Currently on Hydrocortisone 10mg at 8am and 5mg at 3pm for secondary adrenal insuffiency  - On Synthroid 50mcg for hypothyroidism    HEME/ONC:             No issues         DVT ppx: Continue SQL, SCDs, LE Dopp 10/29 neg    RENAL:   - Was being monitored for hyponatremia secondary to SIADH - was on fluid restriction and salt tabs, this morning Na increased from 128 to 137, salt tabs discontinued with repeat BMP this afternoon.     ID:   - Afebrile  - Received post-op abx  - 10/27 COVID neg    GI:    - Continue oral diet  - Continue dulcolax and senna for bowel regimen    DISCHARGE PLANNING:   PT/OT - home, no skilled needs    Plan to be discussed w/ Dr. Bernal  [id number]

## 2020-11-04 NOTE — DIETITIAN INITIAL EVALUATION ADULT. - CHIEF COMPLAINT
50 y/o male with h/o HTN, T2DN, Hyperlipidemia c/o pituitary macroadenoma found upon workup for a low testosterone level, denies headaches or difficulties with his vision. Today he presents to [facility] for scheduled Endoscopic Transphenoidal Removal Pituitary Tumor

## 2020-11-04 NOTE — DISCHARGE NOTE PROVIDER - REASON FOR ADMISSION
s/p TSP resection for pituitary mass, w/ CSF leak repaired with duragen and nasoseptal flap 10/29/2020

## 2020-11-04 NOTE — DISCHARGE NOTE PROVIDER - PROVIDER TOKENS
PROVIDER:[TOKEN:[[id number]]] PROVIDER:[TOKEN:[[id number]]],PROVIDER:[TOKEN:[[id number]]],PROVIDER:[TOKEN:[[id number]]]

## 2020-11-04 NOTE — PROGRESS NOTE ADULT - ASSESSMENT
48 y/o male s/p TSRP with left nasoseptal flap for small CSF leak POD #6. Post op bleeding controlled with nasopore bilaterally. No active bleeding or clear discharge this AM. No salty or metallic taste in mouth.

## 2020-11-04 NOTE — PROGRESS NOTE ADULT - SUBJECTIVE AND OBJECTIVE BOX
ENT ISSUE/POD: TSRP with left nasoseptal flap for small CSF leak POD#6      HPI: 48 y/o male with h/o HTN, T2DN, Hyperlipidemia c/o pituitary macroadenoma found upon workup for a low testosterone level, denies headaches or difficulties with his vision. Today he presents for scheduled TSRP with left nasoseptal flap for small CSF leak POD#5. Pt. was seen post op for small amount of epistaxis from left nare. Pt. was packed in the OR with nasopore but continued to bleed from left nare intermittently controlled with a small amount of nasopore. Pt. denies salty taste in mouth , headaches, N/V.            PAST MEDICAL & SURGICAL HISTORY:  Pituitary macroadenoma    History of high cholesterol    HTN (hypertension)    T2DM (type 2 diabetes mellitus)    No significant past surgical history      Allergies    No Known Allergies    Intolerances      MEDICATIONS  (STANDING):  atorvastatin 10 milliGRAM(s) Oral at bedtime  bisacodyl 5 milliGRAM(s) Oral at bedtime  dextrose 5%. 1000 milliLiter(s) (50 mL/Hr) IV Continuous <Continuous>  dextrose 50% Injectable 12.5 Gram(s) IV Push once  dextrose 50% Injectable 25 Gram(s) IV Push once  dextrose 50% Injectable 25 Gram(s) IV Push once  enoxaparin Injectable 40 milliGRAM(s) SubCutaneous <User Schedule>  hydrocortisone 5 milliGRAM(s) Oral <User Schedule>  hydrocortisone 10 milliGRAM(s) Oral <User Schedule>  influenza   Vaccine 0.5 milliLiter(s) IntraMuscular once  insulin lispro (ADMELOG) corrective regimen sliding scale   SubCutaneous three times a day before meals  insulin lispro (ADMELOG) corrective regimen sliding scale   SubCutaneous at bedtime  levothyroxine 50 MICROGram(s) Oral daily  losartan 50 milliGRAM(s) Oral daily  metoprolol succinate ER 50 milliGRAM(s) Oral daily  senna 2 Tablet(s) Oral at bedtime  sodium chloride 0.65% Nasal 2 Spray(s) Both Nostrils four times a day    MEDICATIONS  (PRN):  dextrose 40% Gel 15 Gram(s) Oral once PRN Blood Glucose LESS THAN 70 milliGRAM(s)/deciliter  glucagon  Injectable 1 milliGRAM(s) IntraMuscular once PRN Glucose LESS THAN 70 milligrams/deciliter  guaiFENesin   Syrup  (Sugar-Free) 100 milliGRAM(s) Oral every 6 hours PRN Cough  ondansetron Injectable 4 milliGRAM(s) IV Push every 6 hours PRN Nausea and/or Vomiting  oxyCODONE    IR 5 milliGRAM(s) Oral every 4 hours PRN Moderate Pain (4 - 6)  oxyCODONE    IR 10 milliGRAM(s) Oral every 4 hours PRN Severe Pain (7 - 10)      Social History: see consult    Family history: see consult    ROS:   ENT: all negative except as noted in HPI   Pulm: denies SOB, cough, hemoptysis  Neuro: denies numbness/tingling, loss of sensation  Endo: denies heat/cold intolerance, excessive sweating      Vital Signs Last 24 Hrs  T(C): 36.6 (04 Nov 2020 08:44), Max: 36.8 (03 Nov 2020 16:03)  T(F): 97.9 (04 Nov 2020 08:44), Max: 98.3 (03 Nov 2020 16:03)  HR: 60 (04 Nov 2020 08:44) (60 - 89)  BP: 123/76 (04 Nov 2020 08:44) (98/64 - 132/84)  BP(mean): --  RR: 18 (04 Nov 2020 08:44) (18 - 18)  SpO2: 98% (04 Nov 2020 08:44) (96% - 98%)     11-04    137  |  100  |  19  ----------------------------<  130<H>  3.7   |  24  |  0.95    Ca    9.4      04 Nov 2020 05:36         PHYSICAL EXAM:  Gen: NAD  Skin: No rashes, bruises, or lesions  Head: Normocephalic, Atraumatic  Face: no edema, erythema, or fluctuance. Parotid glands soft without mass  Eyes: no scleral injection  Nose: B/L nares: mustache dressing in place, B/L minimal oozing of blood/mucus, no clear discharge noted.   Mouth: Dry blood in posterior pharynx. No Stridor / Drooling / Trismus. Mucosa moist, tongue/uvula midline  Neck: Flat, supple, no lymphadenopathy, trachea midline, no masses  Lymphatic: No lymphadenopathy  Resp: breathing easily, no stridor  Neuro: facial nerve intact, no facial droop

## 2020-11-04 NOTE — DIETITIAN INITIAL EVALUATION ADULT. - OTHER INFO
Visited pt at bedside. Pt reports having a good appetite both PTA and in-house; consuming >75% of most meals. Pt denies any known food allergies or intolerances. Pt denies any chewing/swallowing difficulty, self-feeding difficulty, nausea/vomiting, constipation, or diarrhea. Pt denies any micronutrient supplementation at home. Pt unable to recall UBW but denies any recent weight changes.     Education: Briefly reviewed importance of Consistent Carbohydrate diet and fluid restrictions. Pt wanted to sleep; states that he already follows recommendations at home. A1C of 7 reveals adequate glucose control. Educational material left at bedside. Pt made aware that RD remains available. Visited pt at bedside. Pt reports having a good appetite both PTA and in-house; consuming >75% of most meals. Pt denies any known food allergies or intolerances. Pt denies any chewing/swallowing difficulty, self-feeding difficulty, nausea/vomiting, constipation, or diarrhea. Pt denies any micronutrient supplementation at home. Pt unable to recall UBW but denies any recent weight changes.     Education: Briefly reviewed importance of Consistent Carbohydrate diet and fluid restrictions. Pt wanted to sleep; states that he already follows recommendations at home. A1C of 7 reveals adequate glucose control. Educational material left at bedside. Encouraged balanced meals with adequate protein for healing; sources discussed. Pt declined nutrition supplements at this time. Pt made aware that RD remains available.

## 2020-11-04 NOTE — PROGRESS NOTE ADULT - ASSESSMENT
50 y/o male with h/o HTN, T2DM controlled (HbA1c 7%), Hyperlipidemia, presented with c/o pituitary macroadenoma found upon workup for a low testosterone level, now s/p resection on 11/29/20. Endocrine team consulted for post op evaluation.        ****A/P not final until signed by attending endocrinologist****   48 y/o male with h/o HTN, T2DM controlled (HbA1c 7%), Hyperlipidemia, presented with c/o pituitary macroadenoma found upon workup for a low testosterone level, now s/p resection on 11/29/20. Endocrine team consulted for post op evaluation.

## 2020-11-04 NOTE — PROGRESS NOTE ADULT - PROBLEM SELECTOR PLAN 6
- Na 137 POD#6, increased from 128 POD#5; stable on afternoon repeat  - Agree with d/c salt tabs  - Recommend also d/c fluid restrict and encourage PO liquids if patient is thirsty; concern for rebound hypernatremia in s/o triphasic response  - Continue to monitor BMP; consider free water if Na continues to increase rapidly - Na 137 POD#6, increased from 128 POD#5; stable on afternoon repeat  - Agree with d/c salt tabs  - Recommend also d/c fluid restrict and encourage PO liquids if patient is thirsty; concern for rebound hypernatremia in s/o triphasic response  - Continue to monitor BMP and urine output for signs of DI; consider free water if Na continues to increase rapidly  - Please check urine Osm - Na 137 POD#6, increased from 128 POD#5; stable on afternoon repeat  - Agree with d/c salt tabs  - Recommend also d/c fluid restrict and encourage PO liquids if patient is thirsty; concern for rebound hypernatremia in s/o triphasic response  - Continue to monitor BMP and urine output for signs of DI  - Please check urine Osm

## 2020-11-04 NOTE — PROGRESS NOTE ADULT - SUBJECTIVE AND OBJECTIVE BOX
Patient is a 49y old  Male who presents with a chief complaint of s/p TSP resection for pituitary mass, w/ CSF leak repaired with duragen and nasoseptal flap 10/29/2020 (04 Nov 2020 14:30)      INTERVAL HPI/OVERNIGHT EVENTS: noted  pt seen and examined  feels well  no new events/complaints      Vital Signs Last 24 Hrs  T(C): 36.5 (04 Nov 2020 19:28), Max: 37.1 (04 Nov 2020 12:23)  T(F): 97.7 (04 Nov 2020 19:28), Max: 98.7 (04 Nov 2020 12:23)  HR: 82 (04 Nov 2020 19:28) (60 - 82)  BP: 136/90 (04 Nov 2020 19:28) (119/77 - 136/90)  BP(mean): --  RR: 18 (04 Nov 2020 19:28) (18 - 18)  SpO2: 100% (04 Nov 2020 19:28) (97% - 100%)    atorvastatin 10 milliGRAM(s) Oral at bedtime  bisacodyl 5 milliGRAM(s) Oral at bedtime  dextrose 40% Gel 15 Gram(s) Oral once PRN  dextrose 5%. 1000 milliLiter(s) IV Continuous <Continuous>  dextrose 50% Injectable 12.5 Gram(s) IV Push once  dextrose 50% Injectable 25 Gram(s) IV Push once  dextrose 50% Injectable 25 Gram(s) IV Push once  enoxaparin Injectable 40 milliGRAM(s) SubCutaneous <User Schedule>  glucagon  Injectable 1 milliGRAM(s) IntraMuscular once PRN  guaiFENesin   Syrup  (Sugar-Free) 100 milliGRAM(s) Oral every 6 hours PRN  hydrocortisone 5 milliGRAM(s) Oral <User Schedule>  hydrocortisone 10 milliGRAM(s) Oral <User Schedule>  influenza   Vaccine 0.5 milliLiter(s) IntraMuscular once  insulin lispro (ADMELOG) corrective regimen sliding scale   SubCutaneous three times a day before meals  insulin lispro (ADMELOG) corrective regimen sliding scale   SubCutaneous at bedtime  losartan 50 milliGRAM(s) Oral daily  metoprolol succinate ER 50 milliGRAM(s) Oral daily  ondansetron Injectable 4 milliGRAM(s) IV Push every 6 hours PRN  oxyCODONE    IR 5 milliGRAM(s) Oral every 4 hours PRN  oxyCODONE    IR 10 milliGRAM(s) Oral every 4 hours PRN  senna 2 Tablet(s) Oral at bedtime  sodium chloride 0.65% Nasal 2 Spray(s) Both Nostrils four times a day      PHYSICAL EXAM:  GENERAL: NAD,   EYES: conjunctiva and sclera clear  ENMT: Moist mucous membranes  NECK: Supple, No JVD, Normal thyroid  CHEST/LUNG: non labored, cta b/l  HEART: Regular rate and rhythm; No murmurs, rubs, or gallops  ABDOMEN: Soft, Nontender, Nondistended; Bowel sounds present  EXTREMITIES:  2+ Peripheral Pulses, No clubbing, cyanosis, or edema  LYMPH: No lymphadenopathy noted  SKIN: No rashes or lesions    Consultant(s) Notes Reviewed:  [x ] YES  [ ] NO  Care Discussed with Consultants/Other Providers [ x] YES  [ ] NO    LABS:    11-04    137  |  99  |  19  ----------------------------<  166<H>  3.9   |  25  |  1.17    Ca    9.5      04 Nov 2020 12:18          CAPILLARY BLOOD GLUCOSE      POCT Blood Glucose.: 159 mg/dL (04 Nov 2020 21:02)  POCT Blood Glucose.: 202 mg/dL (04 Nov 2020 17:14)  POCT Blood Glucose.: 174 mg/dL (04 Nov 2020 12:31)  POCT Blood Glucose.: 128 mg/dL (04 Nov 2020 08:39)              RADIOLOGY & ADDITIONAL TESTS:    Imaging Personally Reviewed:  [x ] YES  [ ] NO

## 2020-11-04 NOTE — DISCHARGE NOTE PROVIDER - CARE PROVIDERS DIRECT ADDRESSES
,[email or web address],DirectAddress_Unknown,DirectAddress_Unknown

## 2020-11-04 NOTE — PROGRESS NOTE ADULT - PROBLEM SELECTOR PLAN 2
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   - Continue with hydrocortisone 10 mg po at 8 am and 5 mg po at 3 pm  - Sick day rules counselling   - Recheck HPA axis as outpatient and follow up with outpatient endocrinologist.

## 2020-11-04 NOTE — DISCHARGE NOTE PROVIDER - NSDCCPCAREPLAN_GEN_ALL_CORE_FT
PRINCIPAL DISCHARGE DIAGNOSIS  Diagnosis: Pituitary macroadenoma  Assessment and Plan of Treatment: s/p transphenoidal resection for pituitary mass, with CSF leak repaired intra-operitively with duragen and nasoseptal flap 10/29/2020  Please follow up with Dr. Bernal in one week after your surgery. Please continue with skull base precautions: NO STRAWS, NO INCENTIVE SPIROMETRY, NO NASAL TRAUMA / NO SWABBING, NO NOSE BLOWING, NO BIPAP/CPAP. For any salty taste or dripping (clear fluid) from your nose - please come to the emergency room immediately. Please continue your nasal spray as needed.   Please make an appointment and follow up with ENT Dr. Ashby in one week after discharge.  Please make an appointment and follow up with your endocrinologist Dr. Deejay Kinney in one week after discharge - on your appointment please have your doctor test for the following: hypothalamic - pituitary - adrenal axis, thyroid function tests, testosterone levels, prolactin levels, IGF-1.  Please make an appointment with your primary care provider (Dr. Arndt [phone]) in one week after discharge.      SECONDARY DISCHARGE DIAGNOSES  Diagnosis: Secondary adrenal insufficiency  Assessment and Plan of Treatment: Secondary adrenal insufficiency  Please make an appointment and follow up with your endocrinologist Dr. Deejay Kinney in one week after discharge. Please continue Hydrocortisone 10 mg daily at 8am and 5mg daily at 3pm. On your appointment please have your doctor check for the hypothalamic-pituitary adrenal axis.    Diagnosis: Hypothyroidism  Assessment and Plan of Treatment: Please make an appointment with your endocrinologist Dr. Dejeay Kinney after discharge. Please continue to take your Synthroid and on your appointment, have your doctor check your thyroid function panel.    Diagnosis: Type 2 diabetes mellitus without complication, without long-term current use of insulin  Assessment and Plan of Treatment: Type 2 diabetes mellitus without complication, without long-term current use of insulin. HgbA1c 7.0  Please make an appointment and follow up with your endocrinologist Dr. Deejay Kinney after discharge. Please continue to take Metformin and Jardiance as prescribed by your doctor.    Diagnosis: Hyperlipidemia, unspecified hyperlipidemia type  Assessment and Plan of Treatment: Hyperlipidemia, unspecified hyperlipidemia type  Please make an appointment with your primary care doctor Dr. Arndt in one  week after discharge [phone]. Please continue to take your Lipitor.    Diagnosis: Essential hypertension  Assessment and Plan of Treatment: Essential hypertension  Please make an appointment with your primary care doctor Dr. Arndt in one week after discharge [phone]. Please continue to take your Metoprolol and Olmesartan as prescribed by your doctor.

## 2020-11-04 NOTE — DISCHARGE NOTE PROVIDER - CARE PROVIDER_API CALL
David Bernal  NEUROSURGERY  [address] [place] [address], [facility] [address]  [place] [address]  Phone: [phone]  Fax: [phone]  Follow Up Time:    David Bernal  NEUROSURGERY  [address] [facility], [address]  [place] [address]  Phone: [phone]  Fax: [phone]  Follow Up Time:     Cristina Ashby  OTOLARYNGOLOGY  [address] [facility], [address]  [place] [address]  Phone: [phone]  Fax: [phone]  Follow Up Time:     Deejay Kinney)  EndocrinologyMetabDiabetes  [address] [facility], [address]  [place] [address]  Phone: [phone]  Fax: [phone]  Follow Up Time:

## 2020-11-04 NOTE — DISCHARGE NOTE PROVIDER - NSDCMRMEDTOKEN_GEN_ALL_CORE_FT
atorvastatin 10 mg oral tablet: 1 tab(s) orally once a day  Jardiance 25 mg oral tablet: 1 tab(s) orally once a day (in the morning)  metFORMIN 500 mg oral tablet: 3 tab(s) orally once a day (at bedtime)  metoprolol succinate 50 mg oral tablet, extended release: 1 tab(s) orally once a day  olmesartan 20 mg oral tablet: 1 tab(s) orally once a day   acetaminophen 325 mg oral tablet: 2 tab(s) orally every 6 hours, As needed, Temp greater or equal to 38C (100.4F), Mild Pain (1 - 3)  atorvastatin 10 mg oral tablet: 1 tab(s) orally once a day  bisacodyl 5 mg oral delayed release tablet: 1 tab(s) orally once a day (at bedtime)  hydrocortisone 10 mg oral tablet: 1 tab(s) orally once a day at 8:00am  hydrocortisone 5 mg oral tablet: 1 tab(s) orally once a day at 3:00PM  Jardiance 25 mg oral tablet: 1 tab(s) orally once a day (in the morning)  levothyroxine 75 mcg (0.075 mg) oral tablet: 1 tab(s) orally once a day  metFORMIN 500 mg oral tablet: 3 tab(s) orally once a day (at bedtime)  metoprolol succinate 50 mg oral tablet, extended release: 1 tab(s) orally once a day  olmesartan 20 mg oral tablet: 1 tab(s) orally once a day  oxyCODONE 5 mg oral tablet: 1 tab(s) orally every 6 hours, As Needed -Moderate Pain (4 - 6) MDD:4 tablets  senna oral tablet: 2 tab(s) orally once a day (at bedtime)  sodium chloride 0.65% nasal spray: 2 spray(s) in each nostril 4 times a day

## 2020-11-04 NOTE — PROGRESS NOTE ADULT - PROBLEM SELECTOR PLAN 3
- TSH 0.48 11/1/20; continue with decreased dose levothyroxine 50mcg po qdaily, to be given on empty stomach and wait 30 minutes before eating or taking other medications and at least 4 hrs apart from MVI and calcium.   - Recheck TFTs as outpatient as above - TSH 0.48 11/1/20; Recommend INCREASE back to 75mcg po qdaily  - Give on empty stomach and wait 30 minutes before eating or taking other medications and at least 4 hrs apart from MVI and calcium.   - Recheck TFTs as outpatient as above - TSH 0.48 11/1/20; Free T4 0.6. Recommend INCREASE back to 75mcg po qdaily  - Give on empty stomach and wait 30 minutes before eating or taking other medications and at least 4 hrs apart from MVI and calcium.   - Recheck TFTs as outpatient as above

## 2020-11-04 NOTE — PROGRESS NOTE ADULT - ASSESSMENT
50 y/o male with h/o HTN, T2DN, Hyperlipidemia c/o pituitary macroadenoma, sp endoscopic endonasal transsphenoidal resection pituitary mass and repair small CSF leak. 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 Arndt    Please call [facility] with questions [phone].

## 2020-11-04 NOTE — PROGRESS NOTE ADULT - SUBJECTIVE AND OBJECTIVE BOX
Chief Complaint/Follow-up on: S/p Pituitary macroadenoma resection    Subjective:  Patient was seen and examined at bedside this morning. No acute events overnight. Patient reports feeling well this morning; no new complaints. Denies fever, chills, n/v, headache, muscle cramps, dizziness, gait instability. Patient has been on fluid restriction and states that he is thirsty. Now POD#6    MEDICATIONS  (STANDING):  atorvastatin 10 milliGRAM(s) Oral at bedtime  bisacodyl 5 milliGRAM(s) Oral at bedtime  dextrose 5%. 1000 milliLiter(s) (50 mL/Hr) IV Continuous <Continuous>  dextrose 50% Injectable 12.5 Gram(s) IV Push once  dextrose 50% Injectable 25 Gram(s) IV Push once  dextrose 50% Injectable 25 Gram(s) IV Push once  enoxaparin Injectable 40 milliGRAM(s) SubCutaneous <User Schedule>  hydrocortisone 5 milliGRAM(s) Oral <User Schedule>  hydrocortisone 10 milliGRAM(s) Oral <User Schedule>  influenza   Vaccine 0.5 milliLiter(s) IntraMuscular once  insulin lispro (ADMELOG) corrective regimen sliding scale   SubCutaneous three times a day before meals  insulin lispro (ADMELOG) corrective regimen sliding scale   SubCutaneous at bedtime  levothyroxine 50 MICROGram(s) Oral daily  losartan 50 milliGRAM(s) Oral daily  metoprolol succinate ER 50 milliGRAM(s) Oral daily  senna 2 Tablet(s) Oral at bedtime  sodium chloride 0.65% Nasal 2 Spray(s) Both Nostrils four times a day    MEDICATIONS  (PRN):  dextrose 40% Gel 15 Gram(s) Oral once PRN Blood Glucose LESS THAN 70 milliGRAM(s)/deciliter  glucagon  Injectable 1 milliGRAM(s) IntraMuscular once PRN Glucose LESS THAN 70 milligrams/deciliter  guaiFENesin   Syrup  (Sugar-Free) 100 milliGRAM(s) Oral every 6 hours PRN Cough  ondansetron Injectable 4 milliGRAM(s) IV Push every 6 hours PRN Nausea and/or Vomiting  oxyCODONE    IR 5 milliGRAM(s) Oral every 4 hours PRN Moderate Pain (4 - 6)  oxyCODONE    IR 10 milliGRAM(s) Oral every 4 hours PRN Severe Pain (7 - 10)      PHYSICAL EXAM:  VITALS: T(C): 37.1 (11-04-20 @ 12:23)  T(F): 98.7 (11-04-20 @ 12:23), Max: 98.7 (11-04-20 @ 12:23)  HR: 80 (11-04-20 @ 12:23) (60 - 89)  BP: 124/72 (11-04-20 @ 12:23) (98/64 - 132/84)  RR:  (18 - 18)  SpO2:  (96% - 98%)  Wt(kg): --  GENERAL: NAD, well-groomed, well-developed  EYES: No proptosis, no injection  HEENT:  Atraumatic, Normocephalic, moist mucous membranes; nares with some dried blood evident improved from prior exam  RESPIRATORY: Clear to auscultation bilaterally; No rales, rhonchi, wheezing, or rubs  CARDIOVASCULAR: Regular rate and rhythm; No murmurs  GI: Soft, nontender, non distended, normal bowel sounds  NEURO: no focal deficits, normal gait    POCT Blood Glucose.: 174 mg/dL (11-04-20 @ 12:31)  POCT Blood Glucose.: 128 mg/dL (11-04-20 @ 08:39)  POCT Blood Glucose.: 175 mg/dL (11-03-20 @ 21:12)  POCT Blood Glucose.: 156 mg/dL (11-03-20 @ 17:05)  POCT Blood Glucose.: 199 mg/dL (11-03-20 @ 12:48)  POCT Blood Glucose.: 126 mg/dL (11-03-20 @ 08:17)  POCT Blood Glucose.: 209 mg/dL (11-02-20 @ 20:57)  POCT Blood Glucose.: 128 mg/dL (11-02-20 @ 17:14)  POCT Blood Glucose.: 205 mg/dL (11-02-20 @ 12:35)  POCT Blood Glucose.: 127 mg/dL (11-02-20 @ 08:40)  POCT Blood Glucose.: 121 mg/dL (11-01-20 @ 20:58)  POCT Blood Glucose.: 120 mg/dL (11-01-20 @ 17:28)    11-04    137  |  99  |  19  ----------------------------<  166<H>  3.9   |  25  |  1.17    EGFR if : 84  EGFR if non : 73    Ca    9.5      11-04    Thyroid Function Tests:  11-01 @ 12:02 TSH 0.48 FreeT4 0.6

## 2020-11-04 NOTE — PROGRESS NOTE ADULT - PROBLEM SELECTOR PLAN 1
Pre op hormonal testing only significant for low testosterone level as per patient. No reports available. Now s/p resection on 11/29/20.   - Follow up surgical pathology report  - Recheck HPA axis and TFTs as outpatient in a month or so along with other pituitary hormones including testosterone, prolactin, IGF-1.   - Visual field testing and endocrine follow up as outpatient. He will follow with his outpatient endocrinologist Dr. Deejay Kinney Pre op hormonal testing only significant for low testosterone level as per patient. No reports available. Now s/p resection on 11/29/20.   - Follow up surgical pathology report  - Recheck HPA axis and TFTs as outpatient in a month or so along with other pituitary hormones including testosterone  - Visual field testing and endocrine follow up as outpatient. He will follow with his outpatient endocrinologist Dr. Deejay Kinney

## 2020-11-05 VITALS
RESPIRATION RATE: 18 BRPM | SYSTOLIC BLOOD PRESSURE: 132 MMHG | DIASTOLIC BLOOD PRESSURE: 88 MMHG | TEMPERATURE: 98 F | HEART RATE: 85 BPM | OXYGEN SATURATION: 97 %

## 2020-11-05 LAB
ANION GAP SERPL CALC-SCNC: 13 MMOL/L — SIGNIFICANT CHANGE UP (ref 5–17)
BUN SERPL-MCNC: 16 MG/DL — SIGNIFICANT CHANGE UP (ref 7–23)
CALCIUM SERPL-MCNC: 9.4 MG/DL — SIGNIFICANT CHANGE UP (ref 8.4–10.5)
CHLORIDE SERPL-SCNC: 100 MMOL/L — SIGNIFICANT CHANGE UP (ref 96–108)
CO2 SERPL-SCNC: 24 MMOL/L — SIGNIFICANT CHANGE UP (ref 22–31)
CREAT SERPL-MCNC: 0.95 MG/DL — SIGNIFICANT CHANGE UP (ref 0.5–1.3)
GLUCOSE BLDC GLUCOMTR-MCNC: 132 MG/DL — HIGH (ref 70–99)
GLUCOSE SERPL-MCNC: 119 MG/DL — HIGH (ref 70–99)
POTASSIUM SERPL-MCNC: 3.4 MMOL/L — LOW (ref 3.5–5.3)
POTASSIUM SERPL-SCNC: 3.4 MMOL/L — LOW (ref 3.5–5.3)
SODIUM SERPL-SCNC: 137 MMOL/L — SIGNIFICANT CHANGE UP (ref 135–145)

## 2020-11-05 PROCEDURE — C1889: CPT

## 2020-11-05 PROCEDURE — 99232 SBSQ HOSP IP/OBS MODERATE 35: CPT | Mod: GC

## 2020-11-05 PROCEDURE — 83735 ASSAY OF MAGNESIUM: CPT

## 2020-11-05 PROCEDURE — 84439 ASSAY OF FREE THYROXINE: CPT

## 2020-11-05 PROCEDURE — 82962 GLUCOSE BLOOD TEST: CPT

## 2020-11-05 PROCEDURE — 84300 ASSAY OF URINE SODIUM: CPT

## 2020-11-05 PROCEDURE — 84146 ASSAY OF PROLACTIN: CPT

## 2020-11-05 PROCEDURE — 93970 EXTREMITY STUDY: CPT

## 2020-11-05 PROCEDURE — 85027 COMPLETE CBC AUTOMATED: CPT

## 2020-11-05 PROCEDURE — 88341 IMHCHEM/IMCYTCHM EA ADD ANTB: CPT

## 2020-11-05 PROCEDURE — A9585: CPT

## 2020-11-05 PROCEDURE — 84436 ASSAY OF TOTAL THYROXINE: CPT

## 2020-11-05 PROCEDURE — 83935 ASSAY OF URINE OSMOLALITY: CPT

## 2020-11-05 PROCEDURE — C1769: CPT

## 2020-11-05 PROCEDURE — C1713: CPT

## 2020-11-05 PROCEDURE — 82533 TOTAL CORTISOL: CPT

## 2020-11-05 PROCEDURE — 84443 ASSAY THYROID STIM HORMONE: CPT

## 2020-11-05 PROCEDURE — 88360 TUMOR IMMUNOHISTOCHEM/MANUAL: CPT

## 2020-11-05 PROCEDURE — 97161 PT EVAL LOW COMPLEX 20 MIN: CPT

## 2020-11-05 PROCEDURE — 81005 URINALYSIS: CPT

## 2020-11-05 PROCEDURE — 70553 MRI BRAIN STEM W/O & W/DYE: CPT

## 2020-11-05 PROCEDURE — 90686 IIV4 VACC NO PRSV 0.5 ML IM: CPT

## 2020-11-05 PROCEDURE — 84100 ASSAY OF PHOSPHORUS: CPT

## 2020-11-05 PROCEDURE — 80048 BASIC METABOLIC PNL TOTAL CA: CPT

## 2020-11-05 PROCEDURE — 88342 IMHCHEM/IMCYTCHM 1ST ANTB: CPT

## 2020-11-05 PROCEDURE — 97165 OT EVAL LOW COMPLEX 30 MIN: CPT

## 2020-11-05 PROCEDURE — 84480 ASSAY TRIIODOTHYRONINE (T3): CPT

## 2020-11-05 PROCEDURE — 88307 TISSUE EXAM BY PATHOLOGIST: CPT

## 2020-11-05 RX ORDER — OXYCODONE HYDROCHLORIDE 5 MG/1
1 TABLET ORAL
Qty: 12 | Refills: 0
Start: 2020-11-05 | End: 2020-11-07

## 2020-11-05 RX ORDER — SODIUM CHLORIDE 0.65 %
2 AEROSOL, SPRAY (ML) NASAL
Qty: 0 | Refills: 0 | DISCHARGE
Start: 2020-11-05

## 2020-11-05 RX ORDER — HYDROCORTISONE 20 MG
1 TABLET ORAL
Qty: 30 | Refills: 0
Start: 2020-11-05 | End: 2020-12-04

## 2020-11-05 RX ORDER — SENNA PLUS 8.6 MG/1
2 TABLET ORAL
Qty: 0 | Refills: 0 | DISCHARGE
Start: 2020-11-05

## 2020-11-05 RX ORDER — ACETAMINOPHEN 500 MG
650 TABLET ORAL EVERY 6 HOURS
Refills: 0 | Status: DISCONTINUED | OUTPATIENT
Start: 2020-11-05 | End: 2020-11-05

## 2020-11-05 RX ORDER — POTASSIUM CHLORIDE 20 MEQ
40 PACKET (EA) ORAL ONCE
Refills: 0 | Status: COMPLETED | OUTPATIENT
Start: 2020-11-05 | End: 2020-11-05

## 2020-11-05 RX ORDER — LEVOTHYROXINE SODIUM 125 MCG
1 TABLET ORAL
Qty: 30 | Refills: 0
Start: 2020-11-05 | End: 2020-12-04

## 2020-11-05 RX ORDER — ACETAMINOPHEN 500 MG
2 TABLET ORAL
Qty: 0 | Refills: 0 | DISCHARGE
Start: 2020-11-05

## 2020-11-05 RX ADMIN — Medication 2 SPRAY(S): at 05:33

## 2020-11-05 RX ADMIN — INFLUENZA VIRUS VACCINE 0.5 MILLILITER(S): 15; 15; 15; 15 SUSPENSION INTRAMUSCULAR at 10:32

## 2020-11-05 RX ADMIN — Medication 75 MICROGRAM(S): at 05:35

## 2020-11-05 RX ADMIN — LOSARTAN POTASSIUM 50 MILLIGRAM(S): 100 TABLET, FILM COATED ORAL at 05:32

## 2020-11-05 RX ADMIN — Medication 650 MILLIGRAM(S): at 08:00

## 2020-11-05 RX ADMIN — Medication 10 MILLIGRAM(S): at 08:18

## 2020-11-05 RX ADMIN — Medication 650 MILLIGRAM(S): at 09:00

## 2020-11-05 RX ADMIN — Medication 40 MILLIEQUIVALENT(S): at 09:32

## 2020-11-05 RX ADMIN — Medication 50 MILLIGRAM(S): at 05:32

## 2020-11-05 NOTE — PROGRESS NOTE ADULT - REASON FOR ADMISSION
admitted after TSP
pituitary adenoma
pituitary mass
pituitary mass
Pituitary Macroadenoma resection
pituitary adenoma resection
TSS
Macroadenoma resection

## 2020-11-05 NOTE — PROGRESS NOTE ADULT - PROBLEM SELECTOR PROBLEM 2
Secondary adrenal insufficiency

## 2020-11-05 NOTE — PROGRESS NOTE ADULT - SUBJECTIVE AND OBJECTIVE BOX
Chief Complaint/Follow-up on: S/p Pituitary macroadenoma resection    Subjective:  Patient was seen and examined at bedside this morning. No acute events overnight. Now POD#7. Patient states that he feels well and is looking forward to being discharged from the hospital today; no new complaints. He denies fever, chills, n/v, headache, increased thirst or urination, dry mouth. Counseled patient on symptoms of DI/hypernatremia and to notify his endocrinologist is he experiences any of these.     MEDICATIONS  (STANDING):  atorvastatin 10 milliGRAM(s) Oral at bedtime  bisacodyl 5 milliGRAM(s) Oral at bedtime  dextrose 5%. 1000 milliLiter(s) (50 mL/Hr) IV Continuous <Continuous>  dextrose 50% Injectable 12.5 Gram(s) IV Push once  dextrose 50% Injectable 25 Gram(s) IV Push once  dextrose 50% Injectable 25 Gram(s) IV Push once  enoxaparin Injectable 40 milliGRAM(s) SubCutaneous <User Schedule>  hydrocortisone 5 milliGRAM(s) Oral <User Schedule>  hydrocortisone 10 milliGRAM(s) Oral <User Schedule>  insulin lispro (ADMELOG) corrective regimen sliding scale   SubCutaneous three times a day before meals  insulin lispro (ADMELOG) corrective regimen sliding scale   SubCutaneous at bedtime  levothyroxine 75 MICROGram(s) Oral daily  losartan 50 milliGRAM(s) Oral daily  metoprolol succinate ER 50 milliGRAM(s) Oral daily  senna 2 Tablet(s) Oral at bedtime  sodium chloride 0.65% Nasal 2 Spray(s) Both Nostrils four times a day    MEDICATIONS  (PRN):  acetaminophen   Tablet .. 650 milliGRAM(s) Oral every 6 hours PRN Temp greater or equal to 38C (100.4F), Mild Pain (1 - 3)  dextrose 40% Gel 15 Gram(s) Oral once PRN Blood Glucose LESS THAN 70 milliGRAM(s)/deciliter  glucagon  Injectable 1 milliGRAM(s) IntraMuscular once PRN Glucose LESS THAN 70 milligrams/deciliter  guaiFENesin   Syrup  (Sugar-Free) 100 milliGRAM(s) Oral every 6 hours PRN Cough  ondansetron Injectable 4 milliGRAM(s) IV Push every 6 hours PRN Nausea and/or Vomiting  oxyCODONE    IR 5 milliGRAM(s) Oral every 4 hours PRN Moderate Pain (4 - 6)  oxyCODONE    IR 10 milliGRAM(s) Oral every 4 hours PRN Severe Pain (7 - 10)      PHYSICAL EXAM:  VITALS: T(C): 36.4 (11-05-20 @ 07:36)  T(F): 97.6 (11-05-20 @ 07:36), Max: 98.7 (11-04-20 @ 12:23)  HR: 85 (11-05-20 @ 07:36) (75 - 85)  BP: 132/88 (11-05-20 @ 07:36) (124/72 - 136/90)  RR:  (18 - 18)  SpO2:  (92% - 100%)  Wt(kg): --  GENERAL: NAD, well-groomed, well-developed  EYES: No proptosis, no injection  HEENT:  Atraumatic, Normocephalic, moist mucous membranes; nares with some dried blood evident   RESPIRATORY: Clear to auscultation bilaterally; No rales, rhonchi, wheezing, or rubs  CARDIOVASCULAR: Regular rate and rhythm; No murmurs  GI: Soft, nontender, non distended, normal bowel sounds  NEURO: no focal deficits, normal gait    POCT Blood Glucose.: 132 mg/dL (11-05-20 @ 08:08)  POCT Blood Glucose.: 159 mg/dL (11-04-20 @ 21:02)  POCT Blood Glucose.: 202 mg/dL (11-04-20 @ 17:14)  POCT Blood Glucose.: 174 mg/dL (11-04-20 @ 12:31)  POCT Blood Glucose.: 128 mg/dL (11-04-20 @ 08:39)  POCT Blood Glucose.: 175 mg/dL (11-03-20 @ 21:12)  POCT Blood Glucose.: 156 mg/dL (11-03-20 @ 17:05)  POCT Blood Glucose.: 199 mg/dL (11-03-20 @ 12:48)  POCT Blood Glucose.: 126 mg/dL (11-03-20 @ 08:17)  POCT Blood Glucose.: 209 mg/dL (11-02-20 @ 20:57)  POCT Blood Glucose.: 128 mg/dL (11-02-20 @ 17:14)  POCT Blood Glucose.: 205 mg/dL (11-02-20 @ 12:35)    11-05    137  |  100  |  16  ----------------------------<  119<H>  3.4<L>   |  24  |  0.95    EGFR if : 108  EGFR if non : 94    Ca    9.4      11-05    Thyroid Function Tests:  11-01 @ 12:02 TSH 0.48 FreeT4 0.6

## 2020-11-05 NOTE — PROGRESS NOTE ADULT - ASSESSMENT
48 y/o male with h/o HTN, T2DM controlled (HbA1c 7%), Hyperlipidemia, presented with c/o pituitary macroadenoma found upon workup for a low testosterone level, now s/p resection on 11/29/20. Endocrine team consulted for post op evaluation.       ***A/P not final until signed by attending*** 50 y/o male with h/o HTN, T2DM controlled (HbA1c 7%), Hyperlipidemia, presented with c/o pituitary macroadenoma found upon workup for a low testosterone level, now s/p resection on 11/29/20. Endocrine team consulted for post op evaluation.

## 2020-11-05 NOTE — PROGRESS NOTE ADULT - PROBLEM SELECTOR PLAN 4
- Recheck testosterone levels as outpatient and resume replacement therapy.   - Last injection ~2weeks pre-operatively

## 2020-11-05 NOTE — PROGRESS NOTE ADULT - PROBLEM SELECTOR PLAN 3
- TSH 0.48 11/1/20; Free T4 0.6. Continue with 75mcg po qdaily  - Give on empty stomach and wait 30 minutes before eating or taking other medications and at least 4 hrs apart from MVI and calcium.   - Recheck TFTs as outpatient as above

## 2020-11-05 NOTE — PROGRESS NOTE ADULT - SUBJECTIVE AND OBJECTIVE BOX
Patient is a 49y old  Male who presents with a chief complaint of s/p TSP resection for pituitary mass, w/ CSF leak repaired with duragen and nasoseptal flap 10/29/2020 (04 Nov 2020 14:30)      INTERVAL HPI/OVERNIGHT EVENTS: noted  pt seen and examined  feels well, no new complaints      Vital Signs Last 24 Hrs  T(C): 36.4 (05 Nov 2020 07:36), Max: 37.1 (04 Nov 2020 12:23)  T(F): 97.6 (05 Nov 2020 07:36), Max: 98.7 (04 Nov 2020 12:23)  HR: 85 (05 Nov 2020 07:36) (75 - 85)  BP: 132/88 (05 Nov 2020 07:36) (124/72 - 136/90)  BP(mean): --  RR: 18 (05 Nov 2020 07:36) (18 - 18)  SpO2: 97% (05 Nov 2020 07:36) (92% - 100%)    acetaminophen   Tablet .. 650 milliGRAM(s) Oral every 6 hours PRN  atorvastatin 10 milliGRAM(s) Oral at bedtime  bisacodyl 5 milliGRAM(s) Oral at bedtime  dextrose 40% Gel 15 Gram(s) Oral once PRN  dextrose 5%. 1000 milliLiter(s) IV Continuous <Continuous>  dextrose 50% Injectable 12.5 Gram(s) IV Push once  dextrose 50% Injectable 25 Gram(s) IV Push once  dextrose 50% Injectable 25 Gram(s) IV Push once  enoxaparin Injectable 40 milliGRAM(s) SubCutaneous <User Schedule>  glucagon  Injectable 1 milliGRAM(s) IntraMuscular once PRN  guaiFENesin   Syrup  (Sugar-Free) 100 milliGRAM(s) Oral every 6 hours PRN  hydrocortisone 5 milliGRAM(s) Oral <User Schedule>  hydrocortisone 10 milliGRAM(s) Oral <User Schedule>  insulin lispro (ADMELOG) corrective regimen sliding scale   SubCutaneous three times a day before meals  insulin lispro (ADMELOG) corrective regimen sliding scale   SubCutaneous at bedtime  levothyroxine 75 MICROGram(s) Oral daily  losartan 50 milliGRAM(s) Oral daily  metoprolol succinate ER 50 milliGRAM(s) Oral daily  ondansetron Injectable 4 milliGRAM(s) IV Push every 6 hours PRN  oxyCODONE    IR 5 milliGRAM(s) Oral every 4 hours PRN  oxyCODONE    IR 10 milliGRAM(s) Oral every 4 hours PRN  senna 2 Tablet(s) Oral at bedtime  sodium chloride 0.65% Nasal 2 Spray(s) Both Nostrils four times a day      PHYSICAL EXAM:  GENERAL: NAD,   EYES: conjunctiva and sclera clear  ENMT: Moist mucous membranes  NECK: Supple, No JVD, Normal thyroid  CHEST/LUNG: non labored, cta b/l  HEART: Regular rate and rhythm; No murmurs, rubs, or gallops  ABDOMEN: Soft, Nontender, Nondistended; Bowel sounds present  EXTREMITIES:  2+ Peripheral Pulses, No clubbing, cyanosis, or edema  LYMPH: No lymphadenopathy noted  SKIN: No rashes or lesions    Consultant(s) Notes Reviewed:  [x ] YES  [ ] NO  Care Discussed with Consultants/Other Providers [ x] YES  [ ] NO    LABS:    11-05    137  |  100  |  16  ----------------------------<  119<H>  3.4<L>   |  24  |  0.95    Ca    9.4      05 Nov 2020 06:37          CAPILLARY BLOOD GLUCOSE      POCT Blood Glucose.: 132 mg/dL (05 Nov 2020 08:08)  POCT Blood Glucose.: 159 mg/dL (04 Nov 2020 21:02)  POCT Blood Glucose.: 202 mg/dL (04 Nov 2020 17:14)  POCT Blood Glucose.: 174 mg/dL (04 Nov 2020 12:31)              RADIOLOGY & ADDITIONAL TESTS:    Imaging Personally Reviewed:  [x ] YES  [ ] NO

## 2020-11-05 NOTE — PROGRESS NOTE ADULT - ASSESSMENT
ASSESSMENT AND PLAN: 48 y/o male with h/o HTN, DM Type 2, Hyperlipidemia c/o pituitary macroadenoma found upon workup for a low testosterone level, denies headaches or difficulties with his vision. s/p TSP, w/ small CSF leak repaired with duragen and nasoseptal flap POD 7    NEURO:   - Continue neuro checks q 4  - Monitor for CSF leak - has had no discharge from nose, no salty taste  - Continue with skull base precautions - no straws, no incentive spirometer, no nasal cannula, no nasal trauma (no swabbing), no nose blowing  - Continue nasal spray  - Continue with pain control w/ tylenol prn and oxycodone prn    PULM:   - No incentive spirometry   - On room air, O2Sat>96%    CV:  - -160  - Continue Metoprolol and Losartan for HTN  - Continue Lipitor for HLD    ENDO:   - Goal Euglycemia 120-180s  - On HISS, continue to monitor FS - stable, continue CCD diet, will continue his DM Typer 2 home regimen on discharge  - Monitor for DI, monitor urine output  - Was monitored for Hyponatremia secondary to SIADH, and was previously on salt tabs and fluid restriction which was discontinued upon his sodium normalizing to 137 yesterday. Subsequent repeats have shown normal Na levels without any DI symptoms - Urine osm and Spec Grav were checked as well. Will follow with Endocrine for any further recommendations.   - Currently on Hydrocortisone 10mg at 8am and 5mg at 3pm for secondary adrenal insuffiency  - Synthroid increased to 75mcg yesterday per Endo    HEME/ONC:             No issues         DVT ppx: Continue SQL, SCDs, LE Dopp 10/29 neg    RENAL:   - Was being monitored for hyponatremia secondary to SIADH - was on fluid restriction and salt tabs, subsequent Na levels have normalized, This morning Na level was 137 from 135. Hypokalemia noted on BMP, supplemented KCl 40x1.     ID:   - Afebrile  - Received post-op abx  - 10/27 COVID neg    GI:    - Continue oral diet  - Continue dulcolax and senna for bowel regimen    DISCHARGE PLANNING:   PT/OT - home, no skilled needs    Plan to be discussed w/ Dr. Bernal  [phone]

## 2020-11-05 NOTE — PROGRESS NOTE ADULT - PROBLEM SELECTOR PROBLEM 4
Hypogonadotropic hypogonadism

## 2020-11-05 NOTE — PROGRESS NOTE ADULT - ATTENDING COMMENTS
Dr. Quintanilla will be covering me tomorrow.  Please contact with any questions or concerns [phone].
Dr. Quintanilla will be covering me tomorrow.  Please contact with any questions or concerns [phone].
Pt seen and examined with team at time of service, I was physically present for the key portions for evaluation and management (E/M) service provided, and preformed key portions of the procedure. Agree with above. Plan discussed with primary team.
Agree with assessment and plan as above by Dr. Dowell. Reviewed all pertinent labs, glucose values, and imaging studies. Modifications made as indicated above. Sodium now stable off salt tabs without evidence of polyuria or polydipsia. Will need to follow-up as outpatient to monitor pituitary axes. Will be going home on hydrocortisone and levothyroxine.    Ignacio Ponce D.O  [phone]
Patient seen and examined. Agree with note as above with addendum: discussed with Dr. Bernal, continue fluid restriction and salt tablets. Na+ up from 125 last night to 127 this morning, likely from triphasic response.   Beata Monsalve MD  Pager: [phone], between 9am-6pm  Nights and Weekends: [phone]
Agree with assessment and plan as above by Dr. Dowell. Reviewed all pertinent labs, glucose values, and imaging studies. Modifications made as indicated above. Continue to physiologic dose of hydrocortisone and reassess HPA axis as outpatient. Increase levothyroxine to 75 mcg. Given Free T4 of 0.6 and his weight expect he would need more than 50 mcg. May need to increase further as outpatient. Can repeat TFTs in 4-6 weeks. Monitor sodium now improved off salt tabs and fluid restriction. Monitor for recurrence of DI with symptoms and urine output. Can check urine osmolality. If sodium stable tomorrow can d/c home with outpatient follow-up.    Ignacio Ponce D.O  [phone]
Patient seen and examined. Agree with note as above with addendum: patient with hyponatremia to 127 and euvolemic, likely SIADH as he is POD#4 and had improper replacement of his steroids. Primary team started stress dose steroids, but I don't think that this is necessary. Would limit to 1-1.5L of fluid. If this persists will send urine lytes to confirm diagnosis and serum uric acid. Discussed with WILMER Marte.   Beata Monsalve MD  Pager: [phone], between 9am-6pm  Nights and Weekends: [phone]

## 2020-11-05 NOTE — PROGRESS NOTE ADULT - ASSESSMENT
50 y/o male with h/o HTN, T2DN, Hyperlipidemia c/o pituitary macroadenoma, sp endoscopic endonasal transsphenoidal resection pituitary mass and repair small CSF leak. 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 Arndt    [facility] questions [phone].

## 2020-11-05 NOTE — PROGRESS NOTE ADULT - PROBLEM SELECTOR PLAN 6
- Na 137 stable POD#7, urine output and osm within normal range  - Continue to monitor BMP and urine output for signs of DI  - Ok for discharge with OP endocrinology f/u and education on DI symptoms

## 2020-11-05 NOTE — PROGRESS NOTE ADULT - PROBLEM SELECTOR PROBLEM 1
Pituitary macroadenoma

## 2020-11-05 NOTE — PROGRESS NOTE ADULT - SUBJECTIVE AND OBJECTIVE BOX
SUBJECTIVE: HPI:  50 y/o male with h/o HTN, T2DN, Hyperlipidemia c/o pituitary macroadenoma found upon workup for a low testosterone level, denies headaches or difficulties with his vision. Today he presents to [facility] for scheduled Endoscopic Transphenoidal Removal Pituitary Tumor on 10/29/20.   ***COVID swab scheduled for 10/26/20***   (22 Oct 2020 08:35)      OVERNIGHT EVENTS: No acute events overnight, patient seen and evaluated at bedside this morning - no complaints of dripping / discharge from his nose, no salty taste, no increased urination.     Vital Signs Last 24 Hrs  T(C): 36.4 (05 Nov 2020 07:36), Max: 37.1 (04 Nov 2020 12:23)  T(F): 97.6 (05 Nov 2020 07:36), Max: 98.7 (04 Nov 2020 12:23)  HR: 85 (05 Nov 2020 07:36) (75 - 85)  BP: 132/88 (05 Nov 2020 07:36) (124/72 - 136/90)  BP(mean): --  RR: 18 (05 Nov 2020 07:36) (18 - 18)  SpO2: 97% (05 Nov 2020 07:36) (92% - 100%)    PHYSICAL EXAM:    General: No Acute Distress     Neurological: Awake, OX3, PERRL, EOMI, face symmetric, following commands, no drift, uppers 5/5, lowers 5/5    Pulmonary: Clear to Auscultation, No Rales, No Rhonchi, No Wheezes     Cardiovascular: S1, S2, Regular Rate and Rhythm     Gastrointestinal: Soft, Nontender, Nondistended     Incision: None    LABS:   11-05    137  |  100  |  16  ----------------------------<  119<H>  3.4<L>   |  24  |  0.95    Ca    9.4      05 Nov 2020 06:37          11-04 @ 07:01  -  11-05 @ 07:00  --------------------------------------------------------  IN: 1290 mL / OUT: 1750 mL / NET: -460 mL      DRAINS: None    MEDICATIONS:  Antibiotics:    Neuro:  acetaminophen   Tablet .. 650 milliGRAM(s) Oral every 6 hours PRN Temp greater or equal to 38C (100.4F), Mild Pain (1 - 3)  ondansetron Injectable 4 milliGRAM(s) IV Push every 6 hours PRN Nausea and/or Vomiting  oxyCODONE    IR 5 milliGRAM(s) Oral every 4 hours PRN Moderate Pain (4 - 6)  oxyCODONE    IR 10 milliGRAM(s) Oral every 4 hours PRN Severe Pain (7 - 10)    Cardiac:  losartan 50 milliGRAM(s) Oral daily  metoprolol succinate ER 50 milliGRAM(s) Oral daily    Pulm:  guaiFENesin   Syrup  (Sugar-Free) 100 milliGRAM(s) Oral every 6 hours PRN Cough    GI/:  bisacodyl 5 milliGRAM(s) Oral at bedtime  senna 2 Tablet(s) Oral at bedtime    Other:   atorvastatin 10 milliGRAM(s) Oral at bedtime  dextrose 40% Gel 15 Gram(s) Oral once PRN Blood Glucose LESS THAN 70 milliGRAM(s)/deciliter  dextrose 5%. 1000 milliLiter(s) IV Continuous <Continuous>  dextrose 50% Injectable 12.5 Gram(s) IV Push once  dextrose 50% Injectable 25 Gram(s) IV Push once  dextrose 50% Injectable 25 Gram(s) IV Push once  enoxaparin Injectable 40 milliGRAM(s) SubCutaneous <User Schedule>  glucagon  Injectable 1 milliGRAM(s) IntraMuscular once PRN Glucose LESS THAN 70 milligrams/deciliter  hydrocortisone 5 milliGRAM(s) Oral <User Schedule>  hydrocortisone 10 milliGRAM(s) Oral <User Schedule>  influenza   Vaccine 0.5 milliLiter(s) IntraMuscular once  insulin lispro (ADMELOG) corrective regimen sliding scale   SubCutaneous three times a day before meals  insulin lispro (ADMELOG) corrective regimen sliding scale   SubCutaneous at bedtime  levothyroxine 75 MICROGram(s) Oral daily  potassium chloride    Tablet ER 40 milliEquivalent(s) Oral once  sodium chloride 0.65% Nasal 2 Spray(s) Both Nostrils four times a day    DIET: [] Regular [x] CCD [] Renal [] Puree [] Dysphagia [] Tube Feeds:     IMAGING:   < from: MR Head w/wo IV Cont (10.30.20 @ 14:05) >  IMPRESSION: Since the prior examination of 10/20/2020 there has been transsphenoidal resection of the large pituitary macroadenoma. The residual soft tissue in the sella and suprasellar region appears of similar size with only minimal peripheral enhancement consistent with postoperative resection without interval loss of height. There is no change in mild mass effect on the optic chiasm.      No acute infarcts. Normal intracranial enhancement.      LEONARDA MORELAND MD, ATTENDING RADIOLOGIST  This document has been electronically signed. Oct 30 2020  2:29PM    < end of copied text >

## 2020-11-05 NOTE — PROGRESS NOTE ADULT - PROBLEM SELECTOR PLAN 1
Pre op hormonal testing only significant for low testosterone level as per patient. No reports available. Now s/p resection on 11/29/20.   - Follow up surgical pathology report  - Recheck HPA axis and TFTs as outpatient in a month or so along with other pituitary hormones including testosterone  - Visual field testing and endocrine follow up as outpatient. He will follow with his outpatient endocrinologist Dr. Deejay Kinney

## 2020-11-05 NOTE — PROGRESS NOTE ADULT - PROBLEM SELECTOR PLAN 5
- Well controlled, A1c 7% 10/2020, on Metformin and Jardiance as outpatient  - [facility], FS at goal 100-180.  - Consistent carb diet  - Check FS qac and qhs  - Plan to dc on home regimen.
- Well controlled, A1c 7% 10/2020, on Metformin and Jardiance as outpatient  - [facility], FS at goal 100-180.  - consistent carb diet  - check FS qac and qhs  - plan to dc on home regimen.
- Well controlled, A1c 7% 10/2020, on Metformin and Jardiance as outpatient  [facility], FS at goal 100-180.  - consistent carb diet  - check FS qac and qhs  - plan to dc on home regimen.
- Well controlled, A1c 7% 10/2020, on Metformin and Jardiance as outpatient  - [facility], FS at goal 100-180.  - consistent carb diet  - check FS qac and qhs  - plan to dc on home regimen.

## 2020-11-13 ENCOUNTER — APPOINTMENT (OUTPATIENT)
Dept: OTOLARYNGOLOGY | Facility: CLINIC | Age: 50
End: 2020-11-13
Payer: COMMERCIAL

## 2020-11-13 ENCOUNTER — APPOINTMENT (OUTPATIENT)
Dept: SPINE | Facility: CLINIC | Age: 50
End: 2020-11-13
Payer: COMMERCIAL

## 2020-11-13 VITALS
RESPIRATION RATE: 14 BRPM | SYSTOLIC BLOOD PRESSURE: 118 MMHG | WEIGHT: 207 LBS | HEART RATE: 82 BPM | BODY MASS INDEX: 29.63 KG/M2 | DIASTOLIC BLOOD PRESSURE: 78 MMHG | TEMPERATURE: 98.3 F | HEIGHT: 70 IN

## 2020-11-13 VITALS
HEART RATE: 75 BPM | HEIGHT: 70 IN | TEMPERATURE: 97.4 F | SYSTOLIC BLOOD PRESSURE: 119 MMHG | BODY MASS INDEX: 29.35 KG/M2 | WEIGHT: 205 LBS | DIASTOLIC BLOOD PRESSURE: 85 MMHG

## 2020-11-13 PROCEDURE — 99072 ADDL SUPL MATRL&STAF TM PHE: CPT

## 2020-11-13 PROCEDURE — 31231 NASAL ENDOSCOPY DX: CPT | Mod: 58

## 2020-11-13 PROCEDURE — 99024 POSTOP FOLLOW-UP VISIT: CPT

## 2020-11-13 PROCEDURE — 99213 OFFICE O/P EST LOW 20 MIN: CPT

## 2020-11-13 RX ORDER — LEVOTHYROXINE SODIUM 75 UG/1
75 TABLET ORAL
Refills: 0 | Status: ACTIVE | COMMUNITY

## 2020-11-13 RX ORDER — HYDROCORTISONE 5 MG/1
5 TABLET ORAL
Refills: 0 | Status: ACTIVE | COMMUNITY

## 2020-11-13 NOTE — DATA REVIEWED
[de-identified] : Brain and sella with and without contrast done at [facility] on 10/23/2020 and 10/30/2020.

## 2020-11-13 NOTE — PHYSICAL EXAM
[] : septum deviated to the left [Normal] : no abnormal secretions [de-identified] : left crusting along exposed septum.

## 2020-11-13 NOTE — HISTORY OF PRESENT ILLNESS
[de-identified] : s/p TSPR with left Nasoseptal flap.  with Dr. Bernal on 10/29/20\par Pos nasal congestion.  No clear d/c from nose.  No sense of smell or taste.  Vision is good.  Using nasal saline spray, not sinus wash.

## 2020-11-13 NOTE — HISTORY OF PRESENT ILLNESS
[FreeTextEntry1] : JOVANNA LEMA is a 50 year old gentleman who was being worked up for erectile dysfunction and low testosterone and was found to have a pituitary macroadenoma.  The patient underwent surgery to remove the pituitary adenoma on 10/29/2020.\par

## 2020-11-13 NOTE — ASSESSMENT
[FreeTextEntry1] : S/p TSPR with Dr. Bernal on 10/29/20 with nasoseptal flap\par - Start sinus wash BID\par - F/U 3-4 weeks.

## 2020-11-13 NOTE — PROCEDURE
[FreeTextEntry6] : Afrin and lidocaine were topically sprayed. Flexible scope #2 was used. Right nasal passage with normal inferior, middle and superior turbinates. Nasal passage patent with deviated nasal septum and unable to pass beyond it. Left nasal passage with crusting along the exposed septum and massive crusting occluding posteriorly and dry. No mucopurulence or CSF leak appreciated.

## 2020-11-13 NOTE — CONSULT LETTER
[Dear  ___] : Dear  [unfilled], [Consult Letter:] : I had the pleasure of evaluating your patient, [unfilled]. [Please see my note below.] : Please see my note below. [Consult Closing:] : Thank you very much for allowing me to participate in the care of this patient.  If you have any questions, please do not hesitate to contact me. [Sincerely,] : Sincerely, [FreeTextEntry3] : Cristina Ashby MD\par Otolaryngology and Cranial Base Surgery\par Attending Physician - Department of Otolaryngology and Head & Neck Surgery\par [facility]\par  - [employer] [facility]\par Office: [phone]\par Fax: [phone]\par

## 2020-11-13 NOTE — REASON FOR VISIT
[Spouse] : spouse [de-identified] : endoscopic transsphenoidal transnasal approach to the sella.  Removal of a pituitary adenoma. [de-identified] : 10/29/2020 [de-identified] : The patient stated that he was having sinus headaches and was feeling somewhat fatigued.  He denies any evidence of CSF rhinorrhea.  The patient has an appointment with Dr. Cristina Ashby today and an appointment with endocrinologist, Dr. Deejay Kinney next week.  He is on Hydrocortisone and Levothyroxine.

## 2020-11-13 NOTE — ASSESSMENT
[FreeTextEntry1] : The images of the MRIs before and after surgery were reviewed and discussed with the patient and his wife.  Activity levels and proper body mechanics were discussed.  The patient was told to avoid bending and lifting anything heavy.  He is to have a telehealth appointment in 3 weeks to evaluate how he is recovering and is to return to the office in three months with a new MRI of the brain and sella with and without contrast for surveillance.  The patient and his wife stated agreement and understanding of this plan.

## 2020-11-13 NOTE — PHYSICAL EXAM

## 2020-11-13 NOTE — CONSULT LETTER
[Dear  ___] : Dear  [unfilled], [Courtesy Letter:] : I had the pleasure of seeing your patient, [unfilled], in my office today. [Please see my note below.] : Please see my note below. [Referral Closing:] : Thank you very much for seeing this patient.  If you have any questions, please do not hesitate to contact me. [Sincerely,] : Sincerely, [FreeTextEntry2] : Deejay Kinney M.D.\par [address] [place] [address]. [address]\par Megan Drake NFOX  [id number] [FreeTextEntry1] : Best Hendricks\par  1970 [FreeTextEntry3] : MIREYA Salcedo.\par Nurse Practitioner\par Neurosurgery and Spine Department\par [facility]\par

## 2020-12-02 ENCOUNTER — APPOINTMENT (OUTPATIENT)
Dept: SPINE | Facility: CLINIC | Age: 50
End: 2020-12-02
Payer: COMMERCIAL

## 2020-12-02 PROCEDURE — 99024 POSTOP FOLLOW-UP VISIT: CPT

## 2020-12-02 RX ORDER — HYDROCORTISONE 10 MG/1
10 TABLET ORAL
Refills: 0 | Status: DISCONTINUED | COMMUNITY
End: 2020-12-02

## 2020-12-02 NOTE — REASON FOR VISIT
[de-identified] : Endoscopic transsphenoidal approach to the sella for removal of a tumor. [de-identified] : 10/29/2020 [de-identified] : The patient continues to follow with Dr. Ashby and has an appointment on 12/4/2020.  He also continues to follow with  endocrinologist, Dr. Deejay Kinney who has decreased his hydrocortisone dose to 5 mg once a day as of yesterday.  He stated that his sodium levels have been normal.  The patient stated that he feels less fatigued than after surgery.

## 2020-12-02 NOTE — ASSESSMENT
[FreeTextEntry1] : The patient will return in about two months with an MRI of the brain and sella with and without contrast as a baseline postoperative MRI.

## 2020-12-02 NOTE — HISTORY OF PRESENT ILLNESS
[Medical Office: ([facility])___] : at the medical office located in  [FreeTextEntry4] : MIREYA Salcedo.

## 2020-12-04 ENCOUNTER — APPOINTMENT (OUTPATIENT)
Dept: OTOLARYNGOLOGY | Facility: CLINIC | Age: 50
End: 2020-12-04
Payer: COMMERCIAL

## 2020-12-04 VITALS
BODY MASS INDEX: 29.49 KG/M2 | HEIGHT: 70 IN | HEART RATE: 87 BPM | TEMPERATURE: 97.5 F | DIASTOLIC BLOOD PRESSURE: 98 MMHG | WEIGHT: 206 LBS | SYSTOLIC BLOOD PRESSURE: 151 MMHG

## 2020-12-04 PROCEDURE — 99024 POSTOP FOLLOW-UP VISIT: CPT

## 2020-12-04 PROCEDURE — 31231 NASAL ENDOSCOPY DX: CPT | Mod: 58

## 2020-12-04 RX ORDER — OXYCODONE 5 MG/1
5 TABLET ORAL
Qty: 12 | Refills: 0 | Status: ACTIVE | COMMUNITY
Start: 2020-11-05

## 2020-12-04 RX ORDER — CLOBETASOL PROPIONATE 0.5 MG/G
0.05 OINTMENT TOPICAL
Qty: 15 | Refills: 0 | Status: ACTIVE | COMMUNITY
Start: 2019-09-06

## 2020-12-04 RX ORDER — METFORMIN ER 500 MG 500 MG/1
500 TABLET ORAL
Qty: 270 | Refills: 0 | Status: ACTIVE | COMMUNITY
Start: 2020-01-17

## 2020-12-04 RX ORDER — TESTOSTERONE CYPIONATE 200 MG/ML
200 INJECTION, SOLUTION INTRAMUSCULAR
Qty: 4 | Refills: 0 | Status: ACTIVE | COMMUNITY
Start: 2020-06-16

## 2020-12-04 RX ORDER — LEVOTHYROXINE SODIUM 0.1 MG/1
100 TABLET ORAL
Qty: 90 | Refills: 0 | Status: ACTIVE | COMMUNITY
Start: 2020-11-18

## 2020-12-04 NOTE — PROCEDURE
[FreeTextEntry6] : Flexible scope #29 was used. Right nasal passage with normal inferior, middle and superior turbinates. Nasal passage patent with deviated nasal septum and unable to pass beyond it. Left nasal passage with crusting along the exposed septum and septal flap in good position with small amount of crusting over it. No mucopurulence or CSF leak appreciated.

## 2020-12-04 NOTE — CONSULT LETTER
[Dear  ___] : Dear  [unfilled], [Consult Letter:] : I had the pleasure of evaluating your patient, [unfilled]. [Please see my note below.] : Please see my note below. [Consult Closing:] : Thank you very much for allowing me to participate in the care of this patient.  If you have any questions, please do not hesitate to contact me. [Sincerely,] : Sincerely, [FreeTextEntry3] : Cristina Ashby MD\par Otolaryngology and Cranial Base Surgery\par Attending Physician - Department of Otolaryngology and Head & Neck Surgery\par [facility]\par  - [employer] at [facility]\par Office: [phone]\par Fax: [phone]\par

## 2020-12-04 NOTE — REASON FOR VISIT
[Subsequent Evaluation] : a subsequent evaluation for [FreeTextEntry2] : TSPR with Dr. Bernal on 10/29/20

## 2020-12-04 NOTE — ASSESSMENT
[FreeTextEntry1] : S/p TSPR with Dr. Bernal on 10/29/20 with left nasoseptal flap\par - Continue sinus wash BID. \par - F/U 2-3 weeks

## 2020-12-04 NOTE — HISTORY OF PRESENT ILLNESS
[de-identified] : s/p TSPR with left Nasoseptal flap.  with Dr. Bernal on 10/29/20\par Improved nasal congestion, worse on left, feels there is a crust that won't come out.  No clear d/c from nose.  Sense of smell and taste are improving but not fully back.  Vision is good.  Using sinus wash 2-3 times a day.

## 2020-12-04 NOTE — PHYSICAL EXAM
[] : septum deviated to the left [Normal] : no abnormal secretions [de-identified] : left crusting along exposed septum.

## 2020-12-23 ENCOUNTER — APPOINTMENT (OUTPATIENT)
Dept: OTOLARYNGOLOGY | Facility: CLINIC | Age: 50
End: 2020-12-23
Payer: COMMERCIAL

## 2020-12-23 VITALS
BODY MASS INDEX: 29.49 KG/M2 | HEIGHT: 70 IN | HEART RATE: 89 BPM | WEIGHT: 206 LBS | DIASTOLIC BLOOD PRESSURE: 93 MMHG | TEMPERATURE: 98.4 F | SYSTOLIC BLOOD PRESSURE: 137 MMHG

## 2020-12-23 DIAGNOSIS — J34.89 OTHER SPECIFIED DISORDERS OF NOSE AND NASAL SINUSES: ICD-10-CM

## 2020-12-23 DIAGNOSIS — D35.2 BENIGN NEOPLASM OF PITUITARY GLAND: ICD-10-CM

## 2020-12-23 PROCEDURE — 31231 NASAL ENDOSCOPY DX: CPT | Mod: 58

## 2020-12-23 PROCEDURE — 99024 POSTOP FOLLOW-UP VISIT: CPT

## 2020-12-23 NOTE — ASSESSMENT
[FreeTextEntry1] : S/p TSPR with Dr. Bernal on 10/29/20 with left nasoseptal flap\par - Well healed.  \par - Continue sinus wash BID and can taper off. \par - F/U PRN

## 2020-12-23 NOTE — PROCEDURE
[FreeTextEntry6] : Flexible scope #29 was used. Right nasal passage with normal inferior, middle and superior turbinates. Nasal passage patent with deviated nasal septum. Left nasal passage patent. Septectomy well healed without crusting.  Sphenoid well healed. No mucopurulence or CSF leak appreciated.

## 2020-12-23 NOTE — CONSULT LETTER
[Dear  ___] : Dear  [unfilled], [Consult Letter:] : I had the pleasure of evaluating your patient, [unfilled]. [Please see my note below.] : Please see my note below. [Consult Closing:] : Thank you very much for allowing me to participate in the care of this patient.  If you have any questions, please do not hesitate to contact me. [Sincerely,] : Sincerely, [FreeTextEntry3] : Cristina Ashby MD\par Otolaryngology and Cranial Base Surgery\par Attending Physician - Department of Otolaryngology and Head & Neck Surgery\par [facility]\par  - [employer] at [facility]\par Office: [phone]\par Fax: [phone]\par

## 2020-12-23 NOTE — HISTORY OF PRESENT ILLNESS
[de-identified] : s/p TSPR with left Nasoseptal flap.  with Dr. Bernal on 10/29/20\par Improved nasal congestion, worse on right for the past few days.  No clear d/c from nose.  Sense of smell and taste are much improved but not fully back.  Vision is good.  Using sinus wash 2 times a day.

## 2021-03-23 ENCOUNTER — APPOINTMENT (OUTPATIENT)
Dept: MRI IMAGING | Facility: IMAGING CENTER | Age: 51
End: 2021-03-23

## 2021-03-24 ENCOUNTER — APPOINTMENT (OUTPATIENT)
Dept: SPINE | Facility: CLINIC | Age: 51
End: 2021-03-24

## 2021-07-27 NOTE — PRE-ANESTHESIA EVALUATION ADULT - RESPIRATORY RATE (BREATHS/MIN)
16
Implemented All Universal Safety Interventions:  [place] to call system. Call bell, personal items and telephone within reach. Instruct patient to call for assistance. Room bathroom lighting operational. Non-slip footwear when patient is off stretcher. Physically safe environment: no spills, clutter or unnecessary equipment. Stretcher in lowest position, wheels locked, appropriate side rails in place.

## 2022-06-30 NOTE — DIETITIAN INITIAL EVALUATION ADULT. - CALCULATED FROM (CAL/KG)
Please confirm cogentin refill request & direction      Last Refill:    benztropine (COGENTIN) 1 MG tablet 30 tablet 0 5/31/2022     Sig - Route:  Take 1 tablet by mouth nightly           Last visit    2.28.22  Discontinue Cogentin 1 mg twice daily        F/U  8.29.22 1880

## 2024-04-08 NOTE — H&P PST ADULT - HISTORY OF PRESENT ILLNESS
It was a pleasure to see you today.  You presented to the clinic for hypertension follow up    Plan:  - Continue amlodipine-valsartan- hydrochlorothiazide  - Come back in 3 months to asses work restrictions and medications   - Will check lipid panel in 3 months  - Consider re start taking atorvastatin and smoke cessation       Follow up appointments:  No follow-ups on file.     Health Maintenance Due  Health Maintenance Due   Topic Date Due    Hepatitis B Vaccine (1 of 3 - 3-dose series) Never done    Pneumococcal Vaccine 0-64 (2 of 2 - PCV) 01/01/2009    Colorectal Cancer Screen-  Never done    Shingles Vaccine (1 of 2) Never done    COVID-19 Vaccine (3 - 2023-24 season) 09/01/2023       Further Instructions:  Please schedule a follow-up with us today before you leave.     For any labs completed today, the results can be accessed through the patient portal on the [employer].  Please sign up!      You may see your test results before I do.  I will be contacting you regarding their interpretation.    If you are unable to get labs completed today, the lab is open Monday through Friday from 8 am to 4:00 pm.  Call to schedule a lab visit.  Please fast at least 8 hours prior to your lab draw if having cholesterol checked.    If you require an imaging study and need to schedule an appointment, call central scheduling at [phone] to schedule all appointments.  Please ask for the location address at the time of the phone call.    If you require a referral to see a specialist, our staff will complete this on your behalf and provide you with the referral prior to you leaving.    Lynda Lees MD     48 y/o male with h/o HTN, T2DN, Hyperlipidemia c/o pituitary macroadenoma found upon workup for a low testosterone level, denies headaches or difficulties with his vision. Today he presents to PST for scheduled Endoscopic Transphenoidal Removal Pituitary Tumor on 10/29/20.   ***COVID swab scheduled for 10/26/20***

## 2024-12-23 NOTE — DATA REVIEWED
[de-identified] : Brain and sella with and without contrast done at [facility] on 8/18/2020 and 2/29/2020.
18.6